# Patient Record
Sex: MALE | Race: BLACK OR AFRICAN AMERICAN | NOT HISPANIC OR LATINO | URBAN - METROPOLITAN AREA
[De-identification: names, ages, dates, MRNs, and addresses within clinical notes are randomized per-mention and may not be internally consistent; named-entity substitution may affect disease eponyms.]

---

## 2022-11-10 ENCOUNTER — INPATIENT (INPATIENT)
Facility: HOSPITAL | Age: 32
LOS: 2 days | Discharge: HOME | End: 2022-11-13
Attending: STUDENT IN AN ORGANIZED HEALTH CARE EDUCATION/TRAINING PROGRAM | Admitting: STUDENT IN AN ORGANIZED HEALTH CARE EDUCATION/TRAINING PROGRAM

## 2022-11-10 VITALS
SYSTOLIC BLOOD PRESSURE: 183 MMHG | TEMPERATURE: 99 F | RESPIRATION RATE: 16 BRPM | DIASTOLIC BLOOD PRESSURE: 105 MMHG | OXYGEN SATURATION: 100 % | HEART RATE: 94 BPM

## 2022-11-10 LAB
ALBUMIN SERPL ELPH-MCNC: 4.4 G/DL — SIGNIFICANT CHANGE UP (ref 3.3–5)
ALP SERPL-CCNC: 75 U/L — SIGNIFICANT CHANGE UP (ref 40–120)
ALT FLD-CCNC: 19 U/L — SIGNIFICANT CHANGE UP (ref 4–41)
ANION GAP SERPL CALC-SCNC: 13 MMOL/L — SIGNIFICANT CHANGE UP (ref 7–14)
AST SERPL-CCNC: 27 U/L — SIGNIFICANT CHANGE UP (ref 4–40)
BASOPHILS # BLD AUTO: 0.02 K/UL — SIGNIFICANT CHANGE UP (ref 0–0.2)
BASOPHILS NFR BLD AUTO: 0.3 % — SIGNIFICANT CHANGE UP (ref 0–2)
BILIRUB SERPL-MCNC: 0.5 MG/DL — SIGNIFICANT CHANGE UP (ref 0.2–1.2)
BUN SERPL-MCNC: 14 MG/DL — SIGNIFICANT CHANGE UP (ref 7–23)
CALCIUM SERPL-MCNC: 9.3 MG/DL — SIGNIFICANT CHANGE UP (ref 8.4–10.5)
CHLORIDE SERPL-SCNC: 101 MMOL/L — SIGNIFICANT CHANGE UP (ref 98–107)
CO2 SERPL-SCNC: 24 MMOL/L — SIGNIFICANT CHANGE UP (ref 22–31)
CREAT SERPL-MCNC: 1.57 MG/DL — HIGH (ref 0.5–1.3)
CRP SERPL-MCNC: 19.9 MG/L — HIGH
EGFR: 60 ML/MIN/1.73M2 — SIGNIFICANT CHANGE UP
EOSINOPHIL # BLD AUTO: 0.04 K/UL — SIGNIFICANT CHANGE UP (ref 0–0.5)
EOSINOPHIL NFR BLD AUTO: 0.7 % — SIGNIFICANT CHANGE UP (ref 0–6)
ERYTHROCYTE [SEDIMENTATION RATE] IN BLOOD: 7 MM/HR — SIGNIFICANT CHANGE UP (ref 1–15)
FLUAV AG NPH QL: SIGNIFICANT CHANGE UP
FLUBV AG NPH QL: SIGNIFICANT CHANGE UP
GLUCOSE SERPL-MCNC: 92 MG/DL — SIGNIFICANT CHANGE UP (ref 70–99)
HCT VFR BLD CALC: 45.5 % — SIGNIFICANT CHANGE UP (ref 39–50)
HGB BLD-MCNC: 15.5 G/DL — SIGNIFICANT CHANGE UP (ref 13–17)
IANC: 4.8 K/UL — SIGNIFICANT CHANGE UP (ref 1.8–7.4)
IMM GRANULOCYTES NFR BLD AUTO: 0.2 % — SIGNIFICANT CHANGE UP (ref 0–0.9)
LYMPHOCYTES # BLD AUTO: 0.5 K/UL — LOW (ref 1–3.3)
LYMPHOCYTES # BLD AUTO: 8.2 % — LOW (ref 13–44)
MCHC RBC-ENTMCNC: 26.6 PG — LOW (ref 27–34)
MCHC RBC-ENTMCNC: 34.1 GM/DL — SIGNIFICANT CHANGE UP (ref 32–36)
MCV RBC AUTO: 78.2 FL — LOW (ref 80–100)
MONOCYTES # BLD AUTO: 0.7 K/UL — SIGNIFICANT CHANGE UP (ref 0–0.9)
MONOCYTES NFR BLD AUTO: 11.5 % — SIGNIFICANT CHANGE UP (ref 2–14)
NEUTROPHILS # BLD AUTO: 4.8 K/UL — SIGNIFICANT CHANGE UP (ref 1.8–7.4)
NEUTROPHILS NFR BLD AUTO: 79.1 % — HIGH (ref 43–77)
NRBC # BLD: 0 /100 WBCS — SIGNIFICANT CHANGE UP (ref 0–0)
NRBC # FLD: 0 K/UL — SIGNIFICANT CHANGE UP (ref 0–0)
PLATELET # BLD AUTO: 197 K/UL — SIGNIFICANT CHANGE UP (ref 150–400)
POTASSIUM SERPL-MCNC: 4.1 MMOL/L — SIGNIFICANT CHANGE UP (ref 3.5–5.3)
POTASSIUM SERPL-SCNC: 4.1 MMOL/L — SIGNIFICANT CHANGE UP (ref 3.5–5.3)
PROT SERPL-MCNC: 7.9 G/DL — SIGNIFICANT CHANGE UP (ref 6–8.3)
RBC # BLD: 5.82 M/UL — HIGH (ref 4.2–5.8)
RBC # FLD: 12.9 % — SIGNIFICANT CHANGE UP (ref 10.3–14.5)
RSV RNA NPH QL NAA+NON-PROBE: SIGNIFICANT CHANGE UP
SARS-COV-2 RNA SPEC QL NAA+PROBE: SIGNIFICANT CHANGE UP
SODIUM SERPL-SCNC: 138 MMOL/L — SIGNIFICANT CHANGE UP (ref 135–145)
WBC # BLD: 6.07 K/UL — SIGNIFICANT CHANGE UP (ref 3.8–10.5)
WBC # FLD AUTO: 6.07 K/UL — SIGNIFICANT CHANGE UP (ref 3.8–10.5)

## 2022-11-10 PROCEDURE — 70498 CT ANGIOGRAPHY NECK: CPT | Mod: 26,MA

## 2022-11-10 PROCEDURE — 70496 CT ANGIOGRAPHY HEAD: CPT | Mod: 26,MA

## 2022-11-10 PROCEDURE — 99285 EMERGENCY DEPT VISIT HI MDM: CPT

## 2022-11-10 RX ORDER — ACETAMINOPHEN 500 MG
650 TABLET ORAL ONCE
Refills: 0 | Status: COMPLETED | OUTPATIENT
Start: 2022-11-10 | End: 2022-11-10

## 2022-11-10 RX ORDER — SODIUM CHLORIDE 9 MG/ML
1000 INJECTION INTRAMUSCULAR; INTRAVENOUS; SUBCUTANEOUS ONCE
Refills: 0 | Status: COMPLETED | OUTPATIENT
Start: 2022-11-10 | End: 2022-11-10

## 2022-11-10 RX ORDER — SODIUM CHLORIDE 9 MG/ML
1000 INJECTION INTRAMUSCULAR; INTRAVENOUS; SUBCUTANEOUS
Refills: 0 | Status: DISCONTINUED | OUTPATIENT
Start: 2022-11-10 | End: 2022-11-11

## 2022-11-10 RX ORDER — METOCLOPRAMIDE HCL 10 MG
10 TABLET ORAL ONCE
Refills: 0 | Status: COMPLETED | OUTPATIENT
Start: 2022-11-10 | End: 2022-11-10

## 2022-11-10 RX ADMIN — Medication 650 MILLIGRAM(S): at 22:46

## 2022-11-10 RX ADMIN — Medication 10 MILLIGRAM(S): at 16:42

## 2022-11-10 RX ADMIN — Medication 650 MILLIGRAM(S): at 16:43

## 2022-11-10 RX ADMIN — SODIUM CHLORIDE 1000 MILLILITER(S): 9 INJECTION INTRAMUSCULAR; INTRAVENOUS; SUBCUTANEOUS at 16:43

## 2022-11-10 RX ADMIN — Medication 650 MILLIGRAM(S): at 23:45

## 2022-11-10 RX ADMIN — Medication 650 MILLIGRAM(S): at 17:00

## 2022-11-10 NOTE — ED ADULT TRIAGE NOTE - CHIEF COMPLAINT QUOTE
Pt w/ hx of htn not on medication c/o nausea vomiting neck pain headache feeling light headed intermittent visual changes described as black dots, since last night

## 2022-11-10 NOTE — ED PROVIDER NOTE - CLINICAL SUMMARY MEDICAL DECISION MAKING FREE TEXT BOX
32-year-old male history of hypertension without any medication chief complaint of headache nausea and vomiting.  Given history and physical possibility for undiagnosed migraine versus upper respiratory/gastritis.  Versus lower suspicion for meningitis given no fevers at home.  Will obtain CT scan of head and neck given this neck pain with new nausea and vomiting.  Rule out dissection.  And CT head to rule out mass-effect causing the symptoms.  We will treat accordingly.

## 2022-11-10 NOTE — ED PROVIDER NOTE - PHYSICAL EXAMINATION
GENERAL: Awake, alert, NAD Patient is well-appearing  HEENT: NC/AT, moist mucous membranes, PERRL, EOMI Visual acuity 20/20 bilateral  LUNGS: CTAB, no wheezes or crackles   CARDIAC: RRR, no m/r/g  ABDOMEN: Soft, non tender, non distended, no rebound, no guarding  BACK: No midline spinal tenderness, no CVA tenderness  EXT: No edema, no calf tenderness, 2+ DP pulses bilaterally, no deformities.  NEURO: NEURO: alert, CN 2-12 intact,, sensation intact throughout, coordination shows no abnormalities , finger to nose accomplished b/l , motor 5/5 RUE/LUE/RLE/LLE/EHL/Plantar flexion,  SKIN: Warm and dry. No rash.  PSYCH: Normal affect.

## 2022-11-10 NOTE — ED PROVIDER NOTE - OBJECTIVE STATEMENT
32-year-old male history of hypertension does not take any medication for since has been out of medication for 2 years.  Coming in for headache nausea and vomiting.  Patient notes since past approximately 5 days ago he has been having a headache frontal in nature.  This is like his other headaches in same location but the severity is increased.  Patient has also been having small episodes of vomiting as well 2 small episodes today.  Described as green nonbloody.  Patient also states he is seeing black dots in his vision.  Otherwise no reported fever at home. 32-year-old male history of hypertension does not take any medication for since has been out of medication for 2 years.  Coming in for headache nausea and vomiting.  Patient notes since past approximately 5 days ago he has been having a headache frontal in nature.  This is like his other headaches in same location but the severity is increased.  Patient has also been having small episodes of vomiting as well 2 small episodes today.  Described as green nonbloody.  Patient also states he is seeing black dots in his vision. Otherwise no reported fever at home. No palliative or provocative factors. No other current complaints at this time.

## 2022-11-10 NOTE — ED PROVIDER NOTE - ATTENDING CONTRIBUTION TO CARE
I have personally performed a history and physical examination of the patient and discussed management with the resident as well as the patient.  I reviewed the resident's note and agree with the documented findings and plan of care.  I have authored and modified critical sections of the Provider Note, including but not limited to HPI, Physical Exam and MDM.      32-year-old male history of hypertension currently noncompliant with medications for the last 2 years presents with headache, nausea, vomiting.  Considered meningitis however exam benign, likely viral in nature rather than bacterial.  No nuchal rigidity or focal deficits.  Discussed considering LP given fever on second temperature reading, however repeat temperature afebrile after that.  Patient verbalized understanding and has elected to forego LP at this time.  Will obtain CT head and neck given headache in the setting of uncontrolled hypertension to screen for encephalopathic changes.  Symptomatic control as needed

## 2022-11-10 NOTE — ED PROVIDER NOTE - PROGRESS NOTE DETAILS
Patient was revitalized with a temp of 99.6 a few minutes after obtaining Tylenol Corey PGY2: took pt in sign out. still having mild HA. No further fevers but temp 99.9. Spoke w/ him willing to stay in CDU for HA, fever watch and Cr repeat. Ordered cultures and cxr to screen for infectious source. will cdu HERBERT Diaz:  Pt was dispo'd to CDU for neuro observation, supportive care, and repeat labs to ensure improving creatinine; pt was dispo'd with pending full RVP/COVID which subsequently tested positive for COVID (initial RVP with COVID by [facility] was negative).  RVP was repeated and confirmed COVID positive result; pt is being admitted to inpatient service accordingly. Statement Selected

## 2022-11-10 NOTE — ED ADULT NURSE NOTE - OBJECTIVE STATEMENT
presents with c/o nausea/vomiting, HA, neck pain, also reports blurry vision and chills off and on that started yesterday

## 2022-11-10 NOTE — ED ADULT NURSE NOTE - CHIEF COMPLAINT QUOTE
Pt w/ hx of htn not on medication c/o nausea vomiting neck pain headache feeling light headed intermittent visual changes described as black dots, since last night
yes

## 2022-11-11 DIAGNOSIS — R51.9 HEADACHE, UNSPECIFIED: ICD-10-CM

## 2022-11-11 DIAGNOSIS — R11.2 NAUSEA WITH VOMITING, UNSPECIFIED: ICD-10-CM

## 2022-11-11 DIAGNOSIS — H53.9 UNSPECIFIED VISUAL DISTURBANCE: ICD-10-CM

## 2022-11-11 DIAGNOSIS — N17.9 ACUTE KIDNEY FAILURE, UNSPECIFIED: ICD-10-CM

## 2022-11-11 DIAGNOSIS — U07.1 COVID-19: ICD-10-CM

## 2022-11-11 DIAGNOSIS — Z29.9 ENCOUNTER FOR PROPHYLACTIC MEASURES, UNSPECIFIED: ICD-10-CM

## 2022-11-11 DIAGNOSIS — I10 ESSENTIAL (PRIMARY) HYPERTENSION: ICD-10-CM

## 2022-11-11 DIAGNOSIS — R94.31 ABNORMAL ELECTROCARDIOGRAM [ECG] [EKG]: ICD-10-CM

## 2022-11-11 LAB
ALBUMIN SERPL ELPH-MCNC: 4 G/DL — SIGNIFICANT CHANGE UP (ref 3.3–5)
ALP SERPL-CCNC: 71 U/L — SIGNIFICANT CHANGE UP (ref 40–120)
ALT FLD-CCNC: 22 U/L — SIGNIFICANT CHANGE UP (ref 4–41)
ANION GAP SERPL CALC-SCNC: 9 MMOL/L — SIGNIFICANT CHANGE UP (ref 7–14)
APPEARANCE UR: CLEAR — SIGNIFICANT CHANGE UP
AST SERPL-CCNC: 21 U/L — SIGNIFICANT CHANGE UP (ref 4–40)
B PERT DNA SPEC QL NAA+PROBE: SIGNIFICANT CHANGE UP
B PERT+PARAPERT DNA PNL SPEC NAA+PROBE: SIGNIFICANT CHANGE UP
BILIRUB SERPL-MCNC: 0.4 MG/DL — SIGNIFICANT CHANGE UP (ref 0.2–1.2)
BILIRUB UR-MCNC: NEGATIVE — SIGNIFICANT CHANGE UP
BORDETELLA PARAPERTUSSIS (RAPRVP): SIGNIFICANT CHANGE UP
BUN SERPL-MCNC: 11 MG/DL — SIGNIFICANT CHANGE UP (ref 7–23)
C PNEUM DNA SPEC QL NAA+PROBE: SIGNIFICANT CHANGE UP
CALCIUM SERPL-MCNC: 9 MG/DL — SIGNIFICANT CHANGE UP (ref 8.4–10.5)
CHLORIDE SERPL-SCNC: 104 MMOL/L — SIGNIFICANT CHANGE UP (ref 98–107)
CO2 SERPL-SCNC: 25 MMOL/L — SIGNIFICANT CHANGE UP (ref 22–31)
COLOR SPEC: SIGNIFICANT CHANGE UP
CREAT ?TM UR-MCNC: 39 MG/DL — SIGNIFICANT CHANGE UP
CREAT SERPL-MCNC: 1.33 MG/DL — HIGH (ref 0.5–1.3)
D DIMER BLD IA.RAPID-MCNC: 150 NG/ML DDU — SIGNIFICANT CHANGE UP
DIFF PNL FLD: NEGATIVE — SIGNIFICANT CHANGE UP
EGFR: 73 ML/MIN/1.73M2 — SIGNIFICANT CHANGE UP
FERRITIN SERPL-MCNC: 174 NG/ML — SIGNIFICANT CHANGE UP (ref 30–400)
FLUAV SUBTYP SPEC NAA+PROBE: SIGNIFICANT CHANGE UP
FLUBV RNA SPEC QL NAA+PROBE: SIGNIFICANT CHANGE UP
GLUCOSE SERPL-MCNC: 120 MG/DL — HIGH (ref 70–99)
GLUCOSE UR QL: NEGATIVE — SIGNIFICANT CHANGE UP
HADV DNA SPEC QL NAA+PROBE: SIGNIFICANT CHANGE UP
HCOV 229E RNA SPEC QL NAA+PROBE: SIGNIFICANT CHANGE UP
HCOV HKU1 RNA SPEC QL NAA+PROBE: SIGNIFICANT CHANGE UP
HCOV NL63 RNA SPEC QL NAA+PROBE: SIGNIFICANT CHANGE UP
HCOV OC43 RNA SPEC QL NAA+PROBE: SIGNIFICANT CHANGE UP
HCT VFR BLD CALC: 43.4 % — SIGNIFICANT CHANGE UP (ref 39–50)
HGB BLD-MCNC: 15.1 G/DL — SIGNIFICANT CHANGE UP (ref 13–17)
HMPV RNA SPEC QL NAA+PROBE: SIGNIFICANT CHANGE UP
HPIV1 RNA SPEC QL NAA+PROBE: SIGNIFICANT CHANGE UP
HPIV2 RNA SPEC QL NAA+PROBE: SIGNIFICANT CHANGE UP
HPIV3 RNA SPEC QL NAA+PROBE: SIGNIFICANT CHANGE UP
HPIV4 RNA SPEC QL NAA+PROBE: SIGNIFICANT CHANGE UP
KETONES UR-MCNC: NEGATIVE — SIGNIFICANT CHANGE UP
LDH SERPL L TO P-CCNC: 186 U/L — SIGNIFICANT CHANGE UP (ref 135–225)
LEUKOCYTE ESTERASE UR-ACNC: NEGATIVE — SIGNIFICANT CHANGE UP
M PNEUMO DNA SPEC QL NAA+PROBE: SIGNIFICANT CHANGE UP
MAGNESIUM SERPL-MCNC: 1.7 MG/DL — SIGNIFICANT CHANGE UP (ref 1.6–2.6)
MCHC RBC-ENTMCNC: 27 PG — SIGNIFICANT CHANGE UP (ref 27–34)
MCHC RBC-ENTMCNC: 34.8 GM/DL — SIGNIFICANT CHANGE UP (ref 32–36)
MCV RBC AUTO: 77.6 FL — LOW (ref 80–100)
NITRITE UR-MCNC: NEGATIVE — SIGNIFICANT CHANGE UP
NRBC # BLD: 0 /100 WBCS — SIGNIFICANT CHANGE UP (ref 0–0)
NRBC # FLD: 0 K/UL — SIGNIFICANT CHANGE UP (ref 0–0)
PH UR: 7 — SIGNIFICANT CHANGE UP (ref 5–8)
PHOSPHATE SERPL-MCNC: 2.2 MG/DL — LOW (ref 2.5–4.5)
PLATELET # BLD AUTO: 185 K/UL — SIGNIFICANT CHANGE UP (ref 150–400)
POTASSIUM SERPL-MCNC: 3.3 MMOL/L — LOW (ref 3.5–5.3)
POTASSIUM SERPL-SCNC: 3.3 MMOL/L — LOW (ref 3.5–5.3)
PROT SERPL-MCNC: 7 G/DL — SIGNIFICANT CHANGE UP (ref 6–8.3)
PROT UR-MCNC: ABNORMAL
RAPID RVP RESULT: DETECTED
RBC # BLD: 5.59 M/UL — SIGNIFICANT CHANGE UP (ref 4.2–5.8)
RBC # FLD: 13 % — SIGNIFICANT CHANGE UP (ref 10.3–14.5)
RSV RNA SPEC QL NAA+PROBE: SIGNIFICANT CHANGE UP
RV+EV RNA SPEC QL NAA+PROBE: SIGNIFICANT CHANGE UP
SARS-COV-2 RNA SPEC QL NAA+PROBE: DETECTED
SODIUM SERPL-SCNC: 138 MMOL/L — SIGNIFICANT CHANGE UP (ref 135–145)
SODIUM UR-SCNC: 134 MMOL/L — SIGNIFICANT CHANGE UP
SP GR SPEC: 1.04 — SIGNIFICANT CHANGE UP (ref 1.01–1.05)
TROPONIN T, HIGH SENSITIVITY RESULT: 24 NG/L — SIGNIFICANT CHANGE UP
TROPONIN T, HIGH SENSITIVITY RESULT: 26 NG/L — SIGNIFICANT CHANGE UP
UROBILINOGEN FLD QL: SIGNIFICANT CHANGE UP
WBC # BLD: 3.39 K/UL — LOW (ref 3.8–10.5)
WBC # FLD AUTO: 3.39 K/UL — LOW (ref 3.8–10.5)

## 2022-11-11 PROCEDURE — 71046 X-RAY EXAM CHEST 2 VIEWS: CPT | Mod: 26

## 2022-11-11 PROCEDURE — 99223 1ST HOSP IP/OBS HIGH 75: CPT

## 2022-11-11 RX ORDER — ACETAMINOPHEN 500 MG
0 TABLET ORAL
Qty: 0 | Refills: 0 | DISCHARGE

## 2022-11-11 RX ORDER — SODIUM CHLORIDE 9 MG/ML
1000 INJECTION INTRAMUSCULAR; INTRAVENOUS; SUBCUTANEOUS
Refills: 0 | Status: DISCONTINUED | OUTPATIENT
Start: 2022-11-11 | End: 2022-11-13

## 2022-11-11 RX ORDER — ACETAMINOPHEN 500 MG
650 TABLET ORAL EVERY 6 HOURS
Refills: 0 | Status: DISCONTINUED | OUTPATIENT
Start: 2022-11-11 | End: 2022-11-13

## 2022-11-11 RX ORDER — METOCLOPRAMIDE HCL 10 MG
10 TABLET ORAL ONCE
Refills: 0 | Status: COMPLETED | OUTPATIENT
Start: 2022-11-11 | End: 2022-11-11

## 2022-11-11 RX ORDER — INFLUENZA VIRUS VACCINE 15; 15; 15; 15 UG/.5ML; UG/.5ML; UG/.5ML; UG/.5ML
0.5 SUSPENSION INTRAMUSCULAR ONCE
Refills: 0 | Status: DISCONTINUED | OUTPATIENT
Start: 2022-11-11 | End: 2022-11-13

## 2022-11-11 RX ORDER — SODIUM,POTASSIUM PHOSPHATES 278-250MG
1 POWDER IN PACKET (EA) ORAL THREE TIMES A DAY
Refills: 0 | Status: COMPLETED | OUTPATIENT
Start: 2022-11-11 | End: 2022-11-12

## 2022-11-11 RX ORDER — ENOXAPARIN SODIUM 100 MG/ML
40 INJECTION SUBCUTANEOUS EVERY 24 HOURS
Refills: 0 | Status: DISCONTINUED | OUTPATIENT
Start: 2022-11-12 | End: 2022-11-13

## 2022-11-11 RX ORDER — POTASSIUM CHLORIDE 20 MEQ
40 PACKET (EA) ORAL EVERY 4 HOURS
Refills: 0 | Status: COMPLETED | OUTPATIENT
Start: 2022-11-11 | End: 2022-11-11

## 2022-11-11 RX ORDER — AMLODIPINE BESYLATE 2.5 MG/1
5 TABLET ORAL DAILY
Refills: 0 | Status: DISCONTINUED | OUTPATIENT
Start: 2022-11-11 | End: 2022-11-12

## 2022-11-11 RX ADMIN — SODIUM CHLORIDE 75 MILLILITER(S): 9 INJECTION INTRAMUSCULAR; INTRAVENOUS; SUBCUTANEOUS at 10:39

## 2022-11-11 RX ADMIN — Medication 40 MILLIEQUIVALENT(S): at 17:02

## 2022-11-11 RX ADMIN — Medication 1 PACKET(S): at 13:22

## 2022-11-11 RX ADMIN — SODIUM CHLORIDE 100 MILLILITER(S): 9 INJECTION INTRAMUSCULAR; INTRAVENOUS; SUBCUTANEOUS at 00:36

## 2022-11-11 RX ADMIN — AMLODIPINE BESYLATE 5 MILLIGRAM(S): 2.5 TABLET ORAL at 18:34

## 2022-11-11 RX ADMIN — Medication 10 MILLIGRAM(S): at 13:22

## 2022-11-11 RX ADMIN — Medication 40 MILLIEQUIVALENT(S): at 13:22

## 2022-11-11 NOTE — H&P ADULT - HISTORY OF PRESENT ILLNESS
32M PMHx HTN (non compliant with meds) who presents with 3-4 day history of headache and neck pain and 1 day history of N/V, light headedness. Patient states that he started to develop a frontal headache over the past few days. He described pain as 7/10 on pain scale, intermittent, frontally located, partially alleviated by tylenol. He though he should cut down on smoking Hookah every night because it could be related to headaches. He also endorses posterior right sided neck pain, in paraspinal muscles, for the past few days. Pain is made better when looking to the left and made worse when looking to the right. He reports getting new pillows recently and thinks this may be a culprit. Yesterday, he woke up and wasn't feeling well, didn't eat or drink much, and vomited twice. He also noted feeling light headed and seeing faint black spots in his vision which he states he has before when his blood pressure has been high. He endorsed 1 episode of diarrhea. He decided to take himself to the ER because he wanted a CT scan of his brain and to ensure his BP was ok. He otherwise denies CP, SOB, abdominal pain, fever (while at home), chills, constipation, focal weakness, numbness/tingling, dysuria. In the ED he was found with BP in 180s, spiked 103F fever, found to be covid positive, and with THAD. He got CTA H/N and CT brain which where unremarkable. LP was discussed and he declined. His vital signs were otherwise stable.

## 2022-11-11 NOTE — H&P ADULT - ASSESSMENT
32M PMHx HTN (non compliant with meds) who presents with 3-4 day history of headache and neck pain and 1 day history of N/V, light headedness. Found to be febrile to 103F and Covid positive with THAD and uncontrolled BP.

## 2022-11-11 NOTE — H&P ADULT - PROBLEM SELECTOR PLAN 2
- Frontal headache that still persists, mildly improved by tylenol in ED; also got a dose of reglan x 1  - CTH and CTA H/N is negative for acute findings  - Neuro exam grossly non focal  - suspect headache likely in setting of viral infection and dehydration   - maintenance IVF and tylenol PRN - Frontal headache and right sided posterior cervical neck pain still persists, mildly improved by tylenol in ED; also got a dose of reglan x 1  - CTH and CTA H/N is negative for acute findings  - Neuro exam grossly non focal  - suspect headache likely in setting of viral infection and dehydration   - neck pain appears to paraspinal muscle soreness, possibly from sleep differently on new pillows; has full ROM of neck without stiffness; continue to monitor   - maintenance IVF and tylenol PRN

## 2022-11-11 NOTE — H&P ADULT - PROBLEM SELECTOR PLAN 6
- BP as high as 183/105 in ED, now improved to 156/96 but still elevated  - Patient is not on any BP meds, he stopped taking them a long time ago as he felt he didn't need them  - he was prescribed Amlodipine/Benazapril 10-40mg daily in February 2022   - if BP remains elevated would restart with at least Norvasc 5mg daily   - continue to encourage compliance and will need outpatient follow up - BP as high as 183/105 in ED, now improved to 156/96 but still elevated  - Patient is not on any BP meds, he stopped taking them a long time ago as he felt he didn't need them  - he was prescribed Amlodipine/Benazapril 10-40mg daily in February 2022   - if BP remains elevated could consider starting back on Norvasc 5mg daily if needed  - continue to encourage compliance and will need outpatient follow up

## 2022-11-11 NOTE — H&P ADULT - PROBLEM SELECTOR PLAN 1
- Patient with tmax of 103F documented in ED flowsheet, now afebrile s/p 2 doses of tylenol   - found to be covid positive x 2  - Given headaches and fever and complaint of neck pain, LP was discussed in ED to R/O meningitis however low suspicion given source of fever likely Covid, no nuchal rigidity, and neck pain appears to be MSK  - patient declined LP in ED; hold off for now   - check inflammatory markers including D-dimer   - maintain contact/airborne isolation  - no respiratory complaints, would hold off on rem/dex for now given overall low risk in this young healthy patient and no hypoxia   - lovenox for DVT ppx per protocol   - follow UCx and BCx x 2 that are already in lab  - tylenol PRN fever

## 2022-11-11 NOTE — H&P ADULT - PROBLEM SELECTOR PLAN 4
- Patient with sensation of feeling light headed yesterday and transiently saw black spots in vision  - he states that this has happened to him before when his pressure has been high  - CTH and CTA neg   - visual acuity in ED 20/20 bilaterally  - no further visual complaints  - BP control   - if recurrent symptoms would consult opthalmology

## 2022-11-11 NOTE — H&P ADULT - NSHPSOCIALHISTORY_GEN_ALL_CORE
Lives at home alone.     Denies smoking cigarettes.   Does endorse smoking Hookah (tobacco) most nights as of late.   Admits drinking a couple cocktails 3-4x weekly.   Denies drug use.

## 2022-11-11 NOTE — H&P ADULT - RESPIRATORY
normal/clear to auscultation bilaterally/no wheezes/no rales/no rhonchi/no respiratory distress/no use of accessory muscles/breath sounds equal/good air movement/no intercostal retractions

## 2022-11-11 NOTE — H&P ADULT - PROBLEM SELECTOR PLAN 5
- Patients creatinine 1.57, no prior labs to compare to   - no prior hx of kidney disease per patient  - no urinary complaints   - likely acute due to dehydration and viral infection  - s/p 1L NS bolus in ED followed by maintenance IVF   - continue gentle IVF with NS @ 75/hr for now  - repeat BMP this morning  - check UrNa and UrCr to calculate FeNa but suspect prerenal etiology   - if no improvement with hydration will check bladder scan and renal/bladder sonogram   - avoid nephrotoxins

## 2022-11-11 NOTE — H&P ADULT - PROBLEM SELECTOR PLAN 7
- EKG showing NSR @ 89bpm, QTc 481, nonspecific TW changes with biphasic TW V5-V6, avL, and II; no apparent acute ischemic changes, no prior EKG to compare to   - no current cardiac complaints  - will add on screening troponin   - would consider ECHO for further evaluation - EKG showing NSR @ 89bpm, QTc 481, nonspecific TW changes with biphasic TW V5-V6, avL, and II; no apparent acute ischemic changes, no prior EKG to compare to   - no current cardiac complaints  - will add on screening troponin   - check ECHO for further evaluation

## 2022-11-11 NOTE — H&P ADULT - PROBLEM SELECTOR PLAN 3
- Patient's N/V has resolved, no further diarrhea as well  - unclear etiology, possible in setting of viral infection  - abdominal exam is benign   - monitor clinically  - if need can use zofran PRN

## 2022-11-11 NOTE — H&P ADULT - NSICDXFAMILYHX_GEN_ALL_CORE_FT
FAMILY HISTORY:  Sibling  Still living? Unknown  Family history of sickle cell disease, Age at diagnosis: Age Unknown

## 2022-11-11 NOTE — H&P ADULT - NSHPOUTPATIENTPROVIDERS_GEN_ALL_CORE
Followed with a Dr. Alexis in [facility] but lost to follow up, looking for new PMD

## 2022-11-11 NOTE — H&P ADULT - NSHPLABSRESULTS_GEN_ALL_CORE
15.5   6.07  )-----------( 197      ( 10 Nov 2022 16:40 )             45.5   11-10    138  |  101  |  14  ----------------------------<  92  4.1   |  24  |  1.57<H>    Ca    9.3      10 Nov 2022 16:40    TPro  7.9  /  Alb  4.4  /  TBili  0.5  /  DBili  x   /  AST  27  /  ALT  19  /  AlkPhos  75  11-10    UA negative  UCx pending  BCx x 2 pending in lab  ESR 7  CRP 19.9  Covid positive x 2    EKG: NSR @ 89bpm, QTc 481, biphasic TW V5-V6, avL, and II; no apparent acute ischemic changes, no prior EKG to compare to     CXR: Clear lungs.    CT HEAD: Unremarkable, unenhanced CT.    CTA BRAIN: Patent intracranial circulation. No proximal flow-limiting   stenosis, dissection, or occlusion.    CTA NECK: Patent cervical vasculature. No flow limiting stenosis,   dissection, or occlusion.

## 2022-11-12 LAB
ALBUMIN SERPL ELPH-MCNC: 4.1 G/DL — SIGNIFICANT CHANGE UP (ref 3.3–5)
ALP SERPL-CCNC: 73 U/L — SIGNIFICANT CHANGE UP (ref 40–120)
ALT FLD-CCNC: 23 U/L — SIGNIFICANT CHANGE UP (ref 4–41)
ANION GAP SERPL CALC-SCNC: 13 MMOL/L — SIGNIFICANT CHANGE UP (ref 7–14)
ANION GAP SERPL CALC-SCNC: 13 MMOL/L — SIGNIFICANT CHANGE UP (ref 7–14)
AST SERPL-CCNC: 22 U/L — SIGNIFICANT CHANGE UP (ref 4–40)
BILIRUB SERPL-MCNC: 0.4 MG/DL — SIGNIFICANT CHANGE UP (ref 0.2–1.2)
BUN SERPL-MCNC: 12 MG/DL — SIGNIFICANT CHANGE UP (ref 7–23)
BUN SERPL-MCNC: 12 MG/DL — SIGNIFICANT CHANGE UP (ref 7–23)
CALCIUM SERPL-MCNC: 9.2 MG/DL — SIGNIFICANT CHANGE UP (ref 8.4–10.5)
CALCIUM SERPL-MCNC: 9.2 MG/DL — SIGNIFICANT CHANGE UP (ref 8.4–10.5)
CHLORIDE SERPL-SCNC: 101 MMOL/L — SIGNIFICANT CHANGE UP (ref 98–107)
CHLORIDE SERPL-SCNC: 101 MMOL/L — SIGNIFICANT CHANGE UP (ref 98–107)
CO2 SERPL-SCNC: 23 MMOL/L — SIGNIFICANT CHANGE UP (ref 22–31)
CO2 SERPL-SCNC: 23 MMOL/L — SIGNIFICANT CHANGE UP (ref 22–31)
CREAT SERPL-MCNC: 1.27 MG/DL — SIGNIFICANT CHANGE UP (ref 0.5–1.3)
CREAT SERPL-MCNC: 1.27 MG/DL — SIGNIFICANT CHANGE UP (ref 0.5–1.3)
CULTURE RESULTS: SIGNIFICANT CHANGE UP
EGFR: 77 ML/MIN/1.73M2 — SIGNIFICANT CHANGE UP
EGFR: 77 ML/MIN/1.73M2 — SIGNIFICANT CHANGE UP
GLUCOSE SERPL-MCNC: 93 MG/DL — SIGNIFICANT CHANGE UP (ref 70–99)
GLUCOSE SERPL-MCNC: 93 MG/DL — SIGNIFICANT CHANGE UP (ref 70–99)
HCT VFR BLD CALC: 44.6 % — SIGNIFICANT CHANGE UP (ref 39–50)
HGB BLD-MCNC: 15.3 G/DL — SIGNIFICANT CHANGE UP (ref 13–17)
MAGNESIUM SERPL-MCNC: 1.8 MG/DL — SIGNIFICANT CHANGE UP (ref 1.6–2.6)
MCHC RBC-ENTMCNC: 26.9 PG — LOW (ref 27–34)
MCHC RBC-ENTMCNC: 34.3 GM/DL — SIGNIFICANT CHANGE UP (ref 32–36)
MCV RBC AUTO: 78.5 FL — LOW (ref 80–100)
NRBC # BLD: 0 /100 WBCS — SIGNIFICANT CHANGE UP (ref 0–0)
NRBC # FLD: 0 K/UL — SIGNIFICANT CHANGE UP (ref 0–0)
PHOSPHATE SERPL-MCNC: 3.6 MG/DL — SIGNIFICANT CHANGE UP (ref 2.5–4.5)
PLATELET # BLD AUTO: 199 K/UL — SIGNIFICANT CHANGE UP (ref 150–400)
POTASSIUM SERPL-MCNC: 3.8 MMOL/L — SIGNIFICANT CHANGE UP (ref 3.5–5.3)
POTASSIUM SERPL-MCNC: 3.8 MMOL/L — SIGNIFICANT CHANGE UP (ref 3.5–5.3)
POTASSIUM SERPL-SCNC: 3.8 MMOL/L — SIGNIFICANT CHANGE UP (ref 3.5–5.3)
POTASSIUM SERPL-SCNC: 3.8 MMOL/L — SIGNIFICANT CHANGE UP (ref 3.5–5.3)
PROT SERPL-MCNC: 7.5 G/DL — SIGNIFICANT CHANGE UP (ref 6–8.3)
RBC # BLD: 5.68 M/UL — SIGNIFICANT CHANGE UP (ref 4.2–5.8)
RBC # FLD: 13 % — SIGNIFICANT CHANGE UP (ref 10.3–14.5)
SODIUM SERPL-SCNC: 137 MMOL/L — SIGNIFICANT CHANGE UP (ref 135–145)
SODIUM SERPL-SCNC: 137 MMOL/L — SIGNIFICANT CHANGE UP (ref 135–145)
SPECIMEN SOURCE: SIGNIFICANT CHANGE UP
TSH SERPL-MCNC: 1.33 UIU/ML — SIGNIFICANT CHANGE UP (ref 0.27–4.2)
WBC # BLD: 3.49 K/UL — LOW (ref 3.8–10.5)
WBC # FLD AUTO: 3.49 K/UL — LOW (ref 3.8–10.5)

## 2022-11-12 PROCEDURE — 99222 1ST HOSP IP/OBS MODERATE 55: CPT | Mod: GC

## 2022-11-12 PROCEDURE — 99233 SBSQ HOSP IP/OBS HIGH 50: CPT

## 2022-11-12 PROCEDURE — 93306 TTE W/DOPPLER COMPLETE: CPT | Mod: 26

## 2022-11-12 RX ORDER — HYDROCHLOROTHIAZIDE 25 MG
12.5 TABLET ORAL ONCE
Refills: 0 | Status: COMPLETED | OUTPATIENT
Start: 2022-11-12 | End: 2022-11-12

## 2022-11-12 RX ORDER — AMLODIPINE BESYLATE 2.5 MG/1
1 TABLET ORAL
Qty: 30 | Refills: 3
Start: 2022-11-12 | End: 2023-03-11

## 2022-11-12 RX ORDER — AMLODIPINE BESYLATE 2.5 MG/1
10 TABLET ORAL DAILY
Refills: 0 | Status: DISCONTINUED | OUTPATIENT
Start: 2022-11-13 | End: 2022-11-13

## 2022-11-12 RX ORDER — HYDRALAZINE HCL 50 MG
2.5 TABLET ORAL ONCE
Refills: 0 | Status: COMPLETED | OUTPATIENT
Start: 2022-11-12 | End: 2022-11-12

## 2022-11-12 RX ORDER — HYDROCHLOROTHIAZIDE 25 MG
12.5 TABLET ORAL DAILY
Refills: 0 | Status: DISCONTINUED | OUTPATIENT
Start: 2022-11-13 | End: 2022-11-13

## 2022-11-12 RX ORDER — AMLODIPINE BESYLATE 2.5 MG/1
5 TABLET ORAL ONCE
Refills: 0 | Status: COMPLETED | OUTPATIENT
Start: 2022-11-12 | End: 2022-11-12

## 2022-11-12 RX ADMIN — AMLODIPINE BESYLATE 5 MILLIGRAM(S): 2.5 TABLET ORAL at 17:04

## 2022-11-12 RX ADMIN — AMLODIPINE BESYLATE 5 MILLIGRAM(S): 2.5 TABLET ORAL at 05:20

## 2022-11-12 RX ADMIN — Medication 12.5 MILLIGRAM(S): at 13:15

## 2022-11-12 RX ADMIN — Medication 2.5 MILLIGRAM(S): at 19:31

## 2022-11-12 NOTE — PROGRESS NOTE ADULT - PROBLEM SELECTOR PLAN 2
- Frontal headache and right sided posterior cervical neck pain still persists, mildly improved by tylenol in ED; also got a dose of reglan x 1  - CTH and CTA H/N is negative for acute findings  - Neuro exam grossly non focal  - suspect headache likely in setting of viral infection and dehydration   - neck pain appears to paraspinal muscle soreness, possibly from sleep differently on new pillows; has full ROM of neck without stiffness; continue to monitor   - maintenance IVF and tylenol PRN RESOLVED on 11/12 AM exam   - improved w/ Tylenol and Reglan   - CTH and CTA H/N is negative for acute findings  - Neuro exam grossly non focal  - suspect headache likely in setting of viral infection and dehydration   - neck pain appears to paraspinal muscle soreness, possibly from sleep differently on new pillows; has full ROM of neck without stiffness; continue to monitor   - Tylenol prn

## 2022-11-12 NOTE — PROGRESS NOTE ADULT - PROBLEM SELECTOR PLAN 8
- DVT ppx with Lovenox 40mg daily given covid infection per protocol   - Case and plan discussed with Dr. Valdez - DVT ppx with Lovenox 40mg daily given covid infection per protocol     Case Discussed w/ ACP

## 2022-11-12 NOTE — PROGRESS NOTE ADULT - PROBLEM SELECTOR PLAN 6
- BP as high as 183/105 in ED, now improved to 156/96 but still elevated  - Patient is not on any BP meds, he stopped taking them a long time ago as he felt he didn't need them  - he was prescribed Amlodipine/Benazapril 10-40mg daily in February 2022   - if BP remains elevated could consider starting back on Norvasc 5mg daily if needed  - continue to encourage compliance and will need outpatient follow up - BP as high as 183/105 in ED, now improved but still elevated  - Patient is not on any BP meds, he stopped taking them a long time ago as he felt he didn't need them and felt he was gaining weight on them  - he was prescribed Amlodipine/Benazapril 10-40mg daily in February 2022   - amlodipine 5mg + hctz 12.5 mg   - continue to encourage compliance and will need outpatient follow up

## 2022-11-12 NOTE — PROGRESS NOTE ADULT - PROBLEM SELECTOR PLAN 7
- EKG showing NSR @ 89bpm, QTc 481, nonspecific TW changes with biphasic TW V5-V6, avL, and II; no apparent acute ischemic changes, no prior EKG to compare to   - no current cardiac complaints  - will add on screening troponin   - check ECHO for further evaluation - EKG showing NSR @ 89bpm, QTc 481, nonspecific TW changes with biphasic TW V5-V6, avL, and II; no apparent acute ischemic changes, no prior EKG to compare to   - no current cardiac complaints  - will add on screening troponin   - ECHO abnormalities as above---> cardiology consulted awaiting recs

## 2022-11-12 NOTE — DISCHARGE NOTE PROVIDER - HOSPITAL COURSE
32M PMHx HTN (non compliant with meds) who presents with 3-4 day history of headache and neck pain and 1 day history of N/V, light headedness. Found to be febrile to 103F and Covid positive with THAD and uncontrolled BP.     Hospital course:  COVID-19.   - Febrile to 103F, resolved s/p tylenol   - Covid positive x 2; maintain contact/airborne isolation  - d-dimer 150, lovenox 40mg q24h per protocol, DC on _____   - no respiratory complaints, would hold off on rem/dex for now given overall low risk in this young healthy patient and no hypoxia   - UCx (negative) and BCx x 2 NGTD  - Remain on isolation at home to complete 10 day isolation     Uncontrolled hypertension with abnormal EKG   - BP as high as 183/105 in ED, now improved but still elevated  - Patient is not on any BP meds, he stopped taking them a long time ago as he felt he didn't need them and felt he was gaining weight on them  - he was prescribed Amlodipine/Benazapril 10-40mg daily in February 2022   - amlodipine 5mg + hctz 12.5 mg + ____ACEi/ ARB ?_____--> D/C on this regiment  - EKG showing NSR @ 89bpm, QTc 481, nonspecific TW changes with biphasic TW V5-V6, avL, and II; no apparent acute ischemic changes, no prior EKG to compare to   - Trop 24->26  - TTE EF 50%, mild segmental LV systolic dysfunction with hypokinesis of the lateral and inferolateral wall, normal RV systolic function  - Cardiology consulted --> abnormal ECHO finding may be in setting of COVID or elevated BP but not likely ischemic. Outpatient cardiac workup    Persistent headaches.   - In ED, Given headaches and fever and complaint of neck pain, LP was discussed to R/O meningitis.   - Low suspicion for meningitis and known source of fever likely Covid, no nuchal rigidity  - neck pain appears to paraspinal muscle soreness, possibly from sleep differently on new pillows; has full ROM of neck without stiffness  - patient declined LP in ED; hold off   - CTH and CTA H/N is negative for acute findings  - Neuro exam grossly non focal  - RESOLVED on 11/12 AM exam s/p w/ Tylenol and Reglan      Nausea & vomiting.   - unclear etiology, possible in setting of viral infection- RESOLVED   - abdominal exam is benign, monitor clinically     Visual changes.   - Patient with sensation of feeling light headed yesterday and transiently saw black spots in vision  - he states that this has happened to him before when his pressure has been high  - CTH and CTA neg   - visual acuity in ED 20/20 bilaterally  - no further visual complaints    THAD (acute kidney injury).   - Patients creatinine 1.57, no prior labs to compare to; crt today 1.27  - no prior hx of kidney disease per patient  - no urinary complaints   - FeNa 3.3%- intrinsic   - likely acute due to dehydration and viral infection; likely component of intrinsic disease in setting of HTN  - avoid nephrotoxins.    On_________, discussed with __________, patient is medically cleared and optimized for discharge today. All medications were reviewed with attending, and sent to mutually agreed upon pharmacy.  Reviewed discharge medications with patient; All new medications requiring new prescription sent to pharmacy of patients choice. Reviewed need for prescription for previous home medications and new prescriptions sent if requested. Patient in agreement and understands. 32M PMHx HTN (non compliant with meds) who presents with 3-4 day history of headache and neck pain and 1 day history of N/V, light headedness. Found to be febrile to 103F and Covid positive with THAD and uncontrolled BP.     Hospital course:  COVID-19- Febrile to 103F, resolved s/p Tylenol. Covid positive x 2; maintain contact/airborne isolation  - d-dimer 150, lovenox 40mg q24h   - no respiratory complaints, would hold off on rem/dex for now given overall low risk in this young healthy patient and no hypoxia   - UCx (negative) and BCx x 2 NGTD  - Remain on isolation at home to complete 10 day isolation     Uncontrolled hypertension with abnormal EKG - BP as high as 183/105 in ED, now improved but still elevated  - Patient is not on any BP meds, he stopped taking them a long time ago as he felt he didn't need them and felt he was gaining weight on them  - he was prescribed Amlodipine/Benazapril 10-40mg daily in February 2022 was non-compliant  - c/w Amlodipine 5mg + HCTZ 12.5 mg + ____ACEi/ ARB ?  - EKG showing NSR @ 89bpm, QTc 481, nonspecific TW changes with biphasic TW V5-V6, avL, and II; no apparent acute ischemic changes, no prior EKG to compare to   - Trop 24->26  - TTE EF 50%, mild segmental LV systolic dysfunction with hypokinesis of the lateral and inferolateral wall, normal RV systolic function  - Cardiology consulted --> abnormal ECHO finding may be in setting of COVID or elevated BP but not likely ischemic. Outpatient cardiac workup    Persistent headaches- In ED, Given headaches and fever and complaint of neck pain, LP was discussed to R/O meningitis.   - Low suspicion for meningitis and known source of fever likely Covid, no nuchal rigidity  - neck pain appears to paraspinal muscle soreness, possibly from sleep differently on new pillows; has full ROM of neck without stiffness  - patient declined LP in ED; hold off   - CTH and CTA H/N is negative for acute findings. Neuro exam grossly non focal  - RESOLVED on 11/12 AM exam s/p w/ Tylenol and Reglan      Nausea & vomiting- unclear etiology, possible in setting of viral infection- RESOLVED   - abdominal exam is benign, monitor clinically     Visual changes- Patient with sensation of feeling light headed yesterday and transiently saw black spots in vision  - he states that this has happened to him before when his pressure has been high  - CTH and CTA negative, visual acuity in ED 20/20 bilaterally  - no further visual complaints    THAD - Patients creatinine 1.57, no prior labs to compare to; crt today 1.27  - no prior hx of kidney disease per patient, no urinary complaints   - FeNa 3.3%- intrinsic   - likely acute due to dehydration and viral infection; likely component of intrinsic disease in setting of HTN  - avoid nephrotoxins.    On 11/13 discussed with Dr Huddleston, patient is medically cleared and optimized for discharge today. All medications were reviewed with attending, and sent to mutually agreed upon pharmacy. Patient in agreement and understands. 32M PMHx HTN (non compliant with meds) who presents with 3-4 day history of headache and neck pain and 1 day history of N/V, light headedness. Found to be febrile to 103F and Covid positive with THAD and uncontrolled BP.     Hospital course:  COVID-19- Febrile to 103F, resolved s/p Tylenol. Covid positive x 2; maintain contact/airborne isolation  - d-dimer 150, lovenox 40mg q24h   - no respiratory complaints, would hold off on rem/dex for now given overall low risk in this young healthy patient and no hypoxia   - UCx (negative) and BCx x 2 NGTD  - Remain on isolation at home to complete 10 day isolation     Uncontrolled hypertension with abnormal EKG - BP as high as 183/105 in ED, now improved but still elevated  - Patient is not on any BP meds, he stopped taking them a long time ago as he felt he didn't need them and felt he was gaining weight on them  - he was prescribed Amlodipine/Benazapril 10-40mg daily in February 2022 was non-compliant  - c/w Amlodipine 10mg + HCTZ 12.5 mg   - EKG showing NSR @ 89bpm, QTc 481, nonspecific TW changes with biphasic TW V5-V6, avL, and II; no apparent acute ischemic changes, no prior EKG to compare to   - Trop 24->26  - TTE EF 50%, mild segmental LV systolic dysfunction with hypokinesis of the lateral and inferolateral wall, normal RV systolic function  - Cardiology consulted --> abnormal ECHO finding may be in setting of COVID or elevated BP but not likely ischemic. Outpatient cardiac workup    Persistent headaches- In ED, Given headaches and fever and complaint of neck pain, LP was discussed to R/O meningitis.   - Low suspicion for meningitis and known source of fever likely Covid, no nuchal rigidity  - neck pain appears to paraspinal muscle soreness, possibly from sleep differently on new pillows; has full ROM of neck without stiffness  - patient declined LP in ED; hold off   - CTH and CTA H/N is negative for acute findings. Neuro exam grossly non focal  - RESOLVED on 11/12 AM exam s/p w/ Tylenol and Reglan      Nausea & vomiting- unclear etiology, possible in setting of viral infection- RESOLVED   - abdominal exam is benign, monitor clinically     Visual changes- Patient with sensation of feeling light headed yesterday and transiently saw black spots in vision  - he states that this has happened to him before when his pressure has been high  - CTH and CTA negative, visual acuity in ED 20/20 bilaterally  - no further visual complaints    THAD - Patients creatinine 1.57, no prior labs to compare to; crt today 1.27  - no prior hx of kidney disease per patient, no urinary complaints   - FeNa 3.3%- intrinsic   - likely acute due to dehydration and viral infection; likely component of intrinsic disease in setting of HTN  - avoid nephrotoxins.    On 11/13 discussed with Dr Huddleston, patient is medically cleared and optimized for discharge today. All medications were reviewed with attending, and sent to mutually agreed upon pharmacy. Patient in agreement and understands.

## 2022-11-12 NOTE — PROGRESS NOTE ADULT - PROBLEM SELECTOR PLAN 5
*******  - Patients creatinine 1.57, no prior labs to compare to   - no prior hx of kidney disease per patient  - no urinary complaints   - likely acute due to dehydration and viral infection  - s/p 1L NS bolus in ED followed by maintenance IVF   - continue gentle IVF with NS @ 75/hr for now  - repeat BMP this morning  - check UrNa and UrCr to calculate FeNa but suspect prerenal etiology   - if no improvement with hydration will check bladder scan and renal/bladder sonogram   - avoid nephrotoxins - Patients creatinine 1.57, no prior labs to compare to; crt today 1.27  - no prior hx of kidney disease per patient  - no urinary complaints   - likely acute due to dehydration and viral infection; likely component of intrinsic disease in setting of HTN  - avoid nephrotoxins

## 2022-11-12 NOTE — DISCHARGE NOTE PROVIDER - NSFOLLOWUPCLINICS_GEN_ALL_ED_FT
[facility]  Cardiology  [address] [place] [id number]  Phone: [phone]  Fax:     [facility]  General Internal Medicine  [address] [place] [address]  Phone: [phone]  Fax:

## 2022-11-12 NOTE — DISCHARGE NOTE PROVIDER - NSDCCPCAREPLAN_GEN_ALL_CORE_FT
PRINCIPAL DISCHARGE DIAGNOSIS  Diagnosis: COVID-19  Assessment and Plan of Treatment: You have been diagnosed with the COVID-19 virus during your hospital stay. You must self quarantine to complete a 10 day time period starting 11/11.  Monitor for fevers, shortness of breath and cough primarily.    It has been determined that you no longer need hospitalization and can recover while remaining in self-quarantine at home. You should follow the prevention steps below until a healthcare provider or local or state health department says you can return to your normal activities.  1. You should restrict activities outside your home, except for getting medical care.  2. Do not go to work, school, or public areas.  3. Avoid using public transportation, ride-sharing, or taxis.  4. Separate yourself from other people and animals in your home.  5. Call ahead before visiting your doctor.  6. Wear a facemask.  7. Cover your coughs and sneezes.  8. Clean your hands often.  9. Avoid sharing personal household items.  10. Clean all “high-touch” surfaces everyday.  11. Monitor your symptoms.  If you have a medical emergency and need to call 911, notify the dispatch personnel that you have COVID-19 If possible, put on a facemask before emergency medical services arrive.  12. Stopping home isolation.  Patients with confirmed COVID-19 should remain under home isolation precautions for 14 days since the positive COVID-19 test and until the risk of secondary transmission to others is thought to be low. The decision to discontinue home isolation precautions should be made on a case-by-case basis, in consultation with healthcare providers and state and local health departments. Your [facility] Department of Health can be reached at [phone] for further information about COVID-19.      SECONDARY DISCHARGE DIAGNOSES  Diagnosis: HTN (hypertension)  Assessment and Plan of Treatment: Continue with your new blood pressure medications as directed. It is important to remain compliant with these medications to avoid further complications from high blood pressure in the future. Monitor for any visual changes, headaches or dizziness.  Monitor blood pressure regularly.  Follow up with your PCP for further management for high blood pressure.    Diagnosis: THAD (acute kidney injury)  Assessment and Plan of Treatment: - You came in with elevated kidney function tests, which was lilkely due to dehyrdration as it improved with fluid rescusitation. There may be chronic kidney disease in the setting of high blood pressure. stay hydrated and avoid nephrotoxins such as advil. Please follow up with your primary care provider in 1-2 weeks following discharge from the hospital for continued monitoring and management.    Diagnosis: Abnormal echocardiogram  Assessment and Plan of Treatment: We did an ultrasound of your heart, which found evidence of abnormal wall motions. Cardiology evaluated you and found that these results alexandru be in setting of COVID or elevated BP but not likely due to a heart attack. Continue blood pressure medication regimen as directed. Follow up with cardiology as an outpatient for further management and evaluation.     PRINCIPAL DISCHARGE DIAGNOSIS  Diagnosis: COVID-19  Assessment and Plan of Treatment: You have been diagnosed with the COVID-19 virus during your hospital stay. You must self quarantine to complete a 10 day time period starting 11/11.  Monitor for fevers, shortness of breath and cough primarily.    It has been determined that you no longer need hospitalization and can recover while remaining in self-quarantine at home.   - You should restrict activities outside your home, except for getting medical care.  - Wear a facemask. Cover your coughs and sneezes.  - Clean your hands often.  - Monitor your symptoms.  You should remain under home isolation precautions for 10 days since the positive COVID-19 test and until the risk of secondary transmission to others is thought to be low.      SECONDARY DISCHARGE DIAGNOSES  Diagnosis: HTN (hypertension)  Assessment and Plan of Treatment: Continue with your new blood pressure medications as directed. It is important to remain compliant with these medications to avoid further complications from high blood pressure in the future. Monitor for any visual changes, headaches or dizziness.  Monitor blood pressure regularly.  Follow up with your PCP for further management for high blood pressure    Diagnosis: THAD (acute kidney injury)  Assessment and Plan of Treatment: - You came in with elevated kidney function tests, which was lilkely due to dehyrdration as it improved with fluid rescusitation. There may be chronic kidney disease in the setting of high blood pressure. Stay hydrated and avoid nephrotoxins such as advil. Please follow up with your primary care provider in 1-2 weeks following discharge from the hospital for continued monitoring and management.    Diagnosis: Abnormal echocardiogram  Assessment and Plan of Treatment: We did an ultrasound of your heart, which found evidence of abnormal wall motions. Cardiology evaluated you and found that these results may be in setting of COVID or elevated Blood pressure but not likely due to a heart attack. Continue blood pressure medication regimen as directed. Follow up with cardiology as an outpatient for further management and evaluation.

## 2022-11-12 NOTE — CONSULT NOTE ADULT - SUBJECTIVE AND OBJECTIVE BOX
Date of Admission: 11/11/2022    CHIEF COMPLAINT: headache    HISTORY OF PRESENT ILLNESS: 31 y/o AAM with HTN but noncompliant with medications presents with 3-4 day h/o headache and neck pain associated with nausea and some lightheadedness. He had a fever to 103 on admission and was found to be COVID +. Also with THAD which has resolved and uncontrolled BP. Patient at present is feeling well and is anxious to go home. He denies any other cardiac hx, no chest pain or SOB. There was no delta change with troponins but EKG was concerning for lateral TWI without an EKG for comparison but no ischemic changes. An ECHO was done which showed mild segmental LV dysfunction with hypokinesis of lateral and inferolat wall with EF 50%. BP has been labile and difficult to control and he is currently on amlodipine and HCTZ. Cardiology is consulted for abnormal ECHO findings.       Allergies    No Known Allergies    Intolerances    	    MEDICATIONS:  amLODIPine   Tablet 5 milliGRAM(s) Oral daily  enoxaparin Injectable 40 milliGRAM(s) SubCutaneous every 24 hours        acetaminophen     Tablet .. 650 milliGRAM(s) Oral every 6 hours PRN        influenza   Vaccine 0.5 milliLiter(s) IntraMuscular once  sodium chloride 0.9%. 1000 milliLiter(s) IV Continuous <Continuous>      PAST MEDICAL & SURGICAL HISTORY:  Hypertension      No significant past surgical history          FAMILY HISTORY:  Family history of sickle cell disease (Sibling)        SOCIAL HISTORY:    Smoker  denies  Alcohol denies  Drugs denies      REVIEW OF SYSTEMS:  See HPI. Otherwise, 10 point ROS done and otherwise negative.    PHYSICAL EXAM:  T(C): 37.1 (11-12-22 @ 11:00), Max: 37.1 (11-12-22 @ 11:00)  HR: 88 (11-12-22 @ 11:00) (78 - 98)  BP: 148/100 (11-12-22 @ 11:02) (148/99 - 172/125)  RR: 17 (11-12-22 @ 11:00) (17 - 19)  SpO2: 99% (11-12-22 @ 11:00) (98% - 100%)  Wt(kg): --  I&O's Summary      Appearance: NAD, skinn W & D	  HEENT:   Normal oral mucosa, PERRL, EOMI	  Cardiovascular: Normal S1 S2, No JVD, No murmurs, No edema  Respiratory: Lungs clear to auscultation	  Psychiatry: A & O x 3, Mood & affect appropriate  Gastrointestinal:  Soft, Non-tender, + BS	  Skin: No rashes, No ecchymoses, No cyanosis	  Neurologic: Non-focal  Extremities: no C/C/E      LABS:	 	                        15.3   3.49  )-----------( 199      ( 12 Nov 2022 08:00 )             44.6       11-12    137  |  101  |  12  ----------------------------<  93  3.8   |  23  |  1.27  11-11    138  |  104  |  11  ----------------------------<  120<H>  3.3<L>   |  25  |  1.33<H>      Ca    9.2      12 Nov 2022 08:00  Ca    9.0      11 Nov 2022 10:47  Phos  3.6     11-12  Phos  2.2     11-11  Mg     1.80     11-12  Mg     1.70     11-11    TPro  7.5  /  Alb  4.1  /  TBili  0.4  /  DBili  x   /  AST  22  /  ALT  23  /  AlkPhos  73  11-12  TPro  7.0  /  Alb  4.0  /  TBili  0.4  /  DBili  x   /  AST  21  /  ALT  22  /  AlkPhos  71  11-11        CARDIAC MARKERS: troponin 26 and 24  ESR 7   CRP 19.9    TELEMETRY: 	NSR     ECG:  NSR with TWI in lateral leads, no ischemic changes  	      PREVIOUS DIAGNOSTIC TESTING:    [ ] Echocardiogram:    Patient name: FLAVIO GEORGE  YOB: 1990   Age: 32 (M)   MR#: [id number]  Study Date: 11/12/2022  Location: [id number]: JESSICA Gomez  Study quality: Technically good  Referring Physician: Sara Saldaña MD  Blood Pressure: 148/100 mmHg  Height: 170 cm  Weight: 91 kg  BSA: 2 m2  ------------------------------------------------------------------------  PROCEDURE: Transthoracic echocardiogram with 2-D, M-Mode  and complete spectral and color flow Doppler.  INDICATION: Abnormal electrocardiogram (ECG) (EKG) (R94.31)  ------------------------------------------------------------------------  DIMENSIONS:  Dimensions:     Normal Values:  LA:     3.1 cm    2.0 - 4.0 cm  Ao:     3.5 cm    2.0 - 3.8 cm  SEPTUM: 1.0 cm    0.6 - 1.2 cm  PWT:    1.0 cm    0.6 - 1.1 cm  LVIDd:  5.3 cm    3.0 - 5.6 cm  LVIDs:  3.6 cm    1.8 - 4.0 cm  Derived Variables:  LVMI: 99 g/m2  RWT: 0.37  Fractional short: 32 %  Ejection Fraction (Visual Estimate): 50 %  ------------------------------------------------------------------------  OBSERVATIONS:  Mitral Valve: Normal mitral valve. Minimal mitral  regurgitation.  Aortic Root: Normal aortic root.  Aortic Valve: Normal trileaflet aortic valve.  Left Atrium: Normal left atrium. LA volume index = 27  cc/m2.  Left Ventricle: Mild segmental left ventricular systolic  dysfunction with hypokinesis of the lateral and  inferolateral wall.  Normal left ventricular internal  dimensions and wall thicknesses.  Right Heart: Normal right atrium. Normal right ventricular  size and function. Normal tricuspid valve. Normal pulmonic  valve.  Pericardium/PleuraNormal pericardium with no pericardial  effusion.  ------------------------------------------------------------------------  CONCLUSIONS:  1. Mild segmental left ventricular systolic dysfunction  with hypokinesis of the lateral and inferolateral wall.  2. Normal right ventricular size and function.  ------------------------------------------------------------------------  Confirmed on  11/12/2022 - 14:07:32 by Cara Miranda MD  ------------------------------------------------------------------------

## 2022-11-12 NOTE — CONSULT NOTE ADULT - ASSESSMENT
33 y/o AAM with HTN but noncompliant with medications presents with 3-4 day h/o headache and neck pain associated with nausea and some lightheadedness. Found to be COVID + with THAD and uncontrolled BP. Cardiology is consulted for abnormal ECHO findings in setting of COVID and uncontrolled BP.    Discussed with Dr. Whitaker, cardiology attending: abnormal ECHO finding may be in setting of COVID or elevated BP but not likely ischemic. Would continue to control BP with HCTZ and amlodipine and add ACE/ARB. Check thyroid function and proBNP. If necessary an outpatient cardiac workup can be pursued.            31 y/o AAM with HTN but noncompliant with medications presents with 3-4 day h/o headache and neck pain associated with nausea and some lightheadedness. Found to be COVID + with THAD and uncontrolled BP. Cardiology is consulted for abnormal ECHO findings in setting of COVID and uncontrolled BP.    Discussed with Dr. Whitaker, cardiology attending: abnormal ECHO finding may be in setting of COVID or elevated BP but not likely ischemic. Would continue to control BP with HCTZ and amlodipine and if renal function remains stable would add ACE/ARB. Check thyroid function and proBNP. If necessary an outpatient cardiac workup can be considered.

## 2022-11-12 NOTE — CONSULT NOTE ADULT - NS ATTEND AMEND GEN_ALL_CORE FT
Jaquan Baker is a 32 year old man with h/o HTN with variable medication compliance. He presented with 3-4 day h/o headache and neck pain associated with nausea and lightheadedness. He was COVID + with THAD and uncontrolled BP. Cardiology is consulted for abnormal ECHO findings in setting of COVID and uncontrolled BP. BP BP: 160/109 mm Hg with range on 11/12/22, 148/100 - 167/123 mm Hg. Serum creatinine 1.27 mg/dL. TTE 11/12/22 noted mild segmental left ventricular systolic dysfunction (LVEF 50%), with hypokinesis of the lateral and inferolateral wall LV size and wall thicknesses were normal. These TTE findings in setting of COVID infection may be due to myocarditis, though currently no elevation of HS Trop T. Check TSH and serum proBNP.  Management for BP includes HCTZ and amlodipine. If renal function remains stable would add ACE/ARB.

## 2022-11-12 NOTE — CONSULT NOTE ADULT - TIME BILLING
Jaquan Baker is a 32 year old man with h/o HTN with variable medication compliance. He presented with 3-4 day h/o headache and neck pain associated with nausea and lightheadedness. He was COVID + with THAD and uncontrolled BP.  BP: 160/109 mm Hg with range on 11/12/22, 148/100 - 167/123 mm Hg. Serum creatinine 1.27 mg/dL. TTE 11/12/22 noted mild segmental left ventricular systolic dysfunction (LVEF 50%)

## 2022-11-12 NOTE — DISCHARGE NOTE PROVIDER - CARE PROVIDER_API CALL
Primary Care provider,   Phone: (   )    -  Fax: (   )    -  Follow Up Time:    Primary Care provider,   Phone: (   )    -  Fax: (   )    -  Follow Up Time:     Margarito Whitaker (MD)  Cardiovascular Disease; Internal Medicine  [id number] [place] [address], [place] [address]  Phone: [phone]  Fax: [phone]  Follow Up Time:

## 2022-11-12 NOTE — PROGRESS NOTE ADULT - SUBJECTIVE AND OBJECTIVE BOX
****THIS NOTE IS IN PROGRESS ****     INTERVAL HPI/OVERNIGHT EVENTS:  Patient was seen and examined at bedside. As per nurse and patient, no o/n events, patient resting comfortably. No complaints at this time. Patient denies: fever, chills, dizziness, weakness, HA, Changes in vision, CP, palpitations, SOB, cough, N/V/D/C, dysuria, changes in bowel movements, LE edema. ROS otherwise negative.    Vital Signs Last 24 Hrs  T(C): 36.7 (2022 05:00), Max: 36.9 (2022 20:32)  T(F): 98 (2022 05:00), Max: 98.5 (2022 20:32)  HR: 79 (2022 05:00) (72 - 98)  BP: 157/113 (2022 05:00) (148/93 - 172/125)  BP(mean): --  RR: 18 (2022 05:00) (18 - 19)  SpO2: 100% (2022 05:00) (98% - 100%)    Parameters below as of 2022 05:00  Patient On (Oxygen Delivery Method): room air    CAPILLARY BLOOD GLUCOSE    PHYSICAL EXAM:  Constitutional: WDWN, NAD  HEENT: PERRL, EOMI, sclera non-icteric, neck supple, trachea midline, no masses, no JVD, MMM, good dentition  Respiratory: CTA b/l, good air entry b/l, no wheezing, no rhonchi, no rales, without accessory muscle use and no intercostal retractions  Cardiovascular: RRR, normal S1S2, no M/R/G  Gastrointestinal: soft, NTND, no masses palpable, BS normal  Extremities: Warm, well perfused, pulses equal bilateral upper and lower extremities, no edema, no clubbing  Neurological: AAOx3, CN Grossly intact  Skin: Normal temperature, warm, dry    MEDICATIONS  (STANDING):  amLODIPine   Tablet 5 milliGRAM(s) Oral daily  enoxaparin Injectable 40 milliGRAM(s) SubCutaneous every 24 hours  influenza   Vaccine 0.5 milliLiter(s) IntraMuscular once  sodium chloride 0.9%. 1000 milliLiter(s) (75 mL/Hr) IV Continuous <Continuous>    MEDICATIONS  (PRN):  acetaminophen     Tablet .. 650 milliGRAM(s) Oral every 6 hours PRN Severe Pain (7 - 10)      Allergies    No Known Allergies    Intolerances        LABS:                        15.3   3.49  )-----------( 199      ( 2022 08:00 )             44.6     -12    137  |  101  |  12  ----------------------------<  93  3.8   |  23  |  1.27    Ca    9.2      2022 08:00  Phos  3.6     11-12  Mg     1.80         TPro  7.5  /  Alb  4.1  /  TBili  0.4  /  DBili  x   /  AST  22  /  ALT  23  /  AlkPhos  73  11-12      Urinalysis Basic - ( 2022 00:30 )    Color: Light Yellow / Appearance: Clear / S.040 / pH: x  Gluc: x / Ketone: Negative  / Bili: Negative / Urobili: <2 mg/dL   Blood: x / Protein: 30 mg/dL / Nitrite: Negative   Leuk Esterase: Negative / RBC: 4 /HPF / WBC 4 /HPF   Sq Epi: x / Non Sq Epi: 2 /HPF / Bacteria: Few    Ucx negative     Urine Na 134; Urine crt 39     COVID positive       RADIOLOGY & ADDITIONAL TESTS:  Reviewed INTERVAL HPI/OVERNIGHT EVENTS:  Patient seen and examined at bedside. States he feels well. Denies chest pain, shortness of breath. ROS otherwise negative.     Vital Signs Last 24 Hrs  T(C): 36.7 (2022 05:00), Max: 36.9 (2022 20:32)  T(F): 98 (2022 05:00), Max: 98.5 (2022 20:32)  HR: 79 (2022 05:00) (72 - 98)  BP: 157/113 (2022 05:00) (148/93 - 172/125)  BP(mean): --  RR: 18 (2022 05:00) (18 - 19)  SpO2: 100% (2022 05:00) (98% - 100%)    Parameters below as of 2022 05:00  Patient On (Oxygen Delivery Method): room air    CAPILLARY BLOOD GLUCOSE    PHYSICAL EXAM:  Constitutional: Young male, NAD  HEENT: EOMI, sclera non-icteric, neck supple, trachea midline, no masses, no JVD, MMM  Respiratory: CTA b/l, good air entry b/l, no wheezing, no rhonchi, no rales, without accessory muscle use and no intercostal retractions  Cardiovascular: RRR, normal S1S2, no M/R/G  Gastrointestinal: soft, NTND, no masses palpable, BS normal  Extremities: Warm, well perfused, pulses equal bilateral upper and lower extremities, no edema, no clubbing  Neurological: AAOx3, CN Grossly intact  Skin: Normal temperature, warm, dry    MEDICATIONS  (STANDING):  amLODIPine   Tablet 5 milliGRAM(s) Oral daily  enoxaparin Injectable 40 milliGRAM(s) SubCutaneous every 24 hours  influenza   Vaccine 0.5 milliLiter(s) IntraMuscular once  sodium chloride 0.9%. 1000 milliLiter(s) (75 mL/Hr) IV Continuous <Continuous>    MEDICATIONS  (PRN):  acetaminophen     Tablet .. 650 milliGRAM(s) Oral every 6 hours PRN Severe Pain (7 - 10)      Allergies    No Known Allergies    Intolerances        LABS:                        15.3   3.49  )-----------( 199      ( 2022 08:00 )             44.6     -12    137  |  101  |  12  ----------------------------<  93  3.8   |  23  |  1.27    Ca    9.2      2022 08:00  Phos  3.6     11-  Mg     1.80         TPro  7.5  /  Alb  4.1  /  TBili  0.4  /  DBili  x   /  AST  22  /  ALT  23  /  AlkPhos  73  -      Urinalysis Basic - ( 2022 00:30 )    Color: Light Yellow / Appearance: Clear / S.040 / pH: x  Gluc: x / Ketone: Negative  / Bili: Negative / Urobili: <2 mg/dL   Blood: x / Protein: 30 mg/dL / Nitrite: Negative   Leuk Esterase: Negative / RBC: 4 /HPF / WBC 4 /HPF   Sq Epi: x / Non Sq Epi: 2 /HPF / Bacteria: Few    Ucx negative     Urine Na 134; Urine crt 39     COVID positive     ECHO:     CONCLUSIONS:  1. Mild segmental left ventricular systolic dysfunction  with hypokinesis of the lateral and inferolateral wall.  2. Normal right ventricular size and function.      RADIOLOGY & ADDITIONAL TESTS:  Reviewed

## 2022-11-12 NOTE — DISCHARGE NOTE PROVIDER - PROVIDER TOKENS
FREE:[LAST:[Primary Care provider],PHONE:[(   )    -],FAX:[(   )    -]] FREE:[LAST:[Primary Care provider],PHONE:[(   )    -],FAX:[(   )    -]],PROVIDER:[TOKEN:[[id number]]]

## 2022-11-12 NOTE — DISCHARGE NOTE PROVIDER - NSDCFUADDAPPT_GEN_ALL_CORE_FT
Please follow up with your primary care provider in 1-2 weeks following discharge from the hospital for continued monitoring and management. If you do not have a primary care provider please refer to the information for the internal medicine clinic to establish yourself as a patient.     Please call [phone] to arrange for follow up with a cardiologist.

## 2022-11-12 NOTE — PROGRESS NOTE ADULT - PROBLEM SELECTOR PLAN 1
- Patient with tmax of 103F documented in ED flowsheet, now afebrile s/p 2 doses of tylenol   - found to be covid positive x 2  - Given headaches and fever and complaint of neck pain, LP was discussed in ED to R/O meningitis however low suspicion given source of fever likely Covid, no nuchal rigidity, and neck pain appears to be MSK  - patient declined LP in ED; hold off for now   - d-dimer 150   - maintain contact/airborne isolation  - no respiratory complaints, would hold off on rem/dex for now given overall low risk in this young healthy patient and no hypoxia   - lovenox for DVT ppx per protocol   - follow UCx and BCx x 2 NGTD  - tylenol PRN fever - Patient with tmax of 103F documented in ED flowsheet, now afebrile s/p 2 doses of tylenol   - found to be covid positive x 2  - Given headaches and fever and complaint of neck pain, LP was discussed in ED to R/O meningitis however low suspicion given source of fever likely Covid, no nuchal rigidity, and neck pain appears to be MSK  - patient declined LP in ED; hold off for now   - d-dimer 150   - maintain contact/airborne isolation  - no respiratory complaints, would hold off on rem/dex for now given overall low risk in this young healthy patient and no hypoxia   - lovenox for DVT ppx per protocol   - follow UCx (negative) and BCx x 2 NGTD  - tylenol PRN fever

## 2022-11-13 VITALS — SYSTOLIC BLOOD PRESSURE: 152 MMHG | DIASTOLIC BLOOD PRESSURE: 102 MMHG

## 2022-11-13 LAB
ALBUMIN SERPL ELPH-MCNC: 4.2 G/DL — SIGNIFICANT CHANGE UP (ref 3.3–5)
ALP SERPL-CCNC: 73 U/L — SIGNIFICANT CHANGE UP (ref 40–120)
ALT FLD-CCNC: 21 U/L — SIGNIFICANT CHANGE UP (ref 4–41)
ANION GAP SERPL CALC-SCNC: 13 MMOL/L — SIGNIFICANT CHANGE UP (ref 7–14)
AST SERPL-CCNC: 17 U/L — SIGNIFICANT CHANGE UP (ref 4–40)
BILIRUB SERPL-MCNC: 0.3 MG/DL — SIGNIFICANT CHANGE UP (ref 0.2–1.2)
BUN SERPL-MCNC: 14 MG/DL — SIGNIFICANT CHANGE UP (ref 7–23)
CALCIUM SERPL-MCNC: 9.4 MG/DL — SIGNIFICANT CHANGE UP (ref 8.4–10.5)
CHLORIDE SERPL-SCNC: 99 MMOL/L — SIGNIFICANT CHANGE UP (ref 98–107)
CO2 SERPL-SCNC: 24 MMOL/L — SIGNIFICANT CHANGE UP (ref 22–31)
CREAT SERPL-MCNC: 1.38 MG/DL — HIGH (ref 0.5–1.3)
EGFR: 70 ML/MIN/1.73M2 — SIGNIFICANT CHANGE UP
GLUCOSE SERPL-MCNC: 106 MG/DL — HIGH (ref 70–99)
HCT VFR BLD CALC: 47.2 % — SIGNIFICANT CHANGE UP (ref 39–50)
HGB BLD-MCNC: 16.3 G/DL — SIGNIFICANT CHANGE UP (ref 13–17)
MAGNESIUM SERPL-MCNC: 2.1 MG/DL — SIGNIFICANT CHANGE UP (ref 1.6–2.6)
MCHC RBC-ENTMCNC: 27.1 PG — SIGNIFICANT CHANGE UP (ref 27–34)
MCHC RBC-ENTMCNC: 34.5 GM/DL — SIGNIFICANT CHANGE UP (ref 32–36)
MCV RBC AUTO: 78.5 FL — LOW (ref 80–100)
NRBC # BLD: 0 /100 WBCS — SIGNIFICANT CHANGE UP (ref 0–0)
NRBC # FLD: 0 K/UL — SIGNIFICANT CHANGE UP (ref 0–0)
NT-PROBNP SERPL-SCNC: 456 PG/ML — HIGH
PHOSPHATE SERPL-MCNC: 3.9 MG/DL — SIGNIFICANT CHANGE UP (ref 2.5–4.5)
PLATELET # BLD AUTO: 234 K/UL — SIGNIFICANT CHANGE UP (ref 150–400)
POTASSIUM SERPL-MCNC: 3.4 MMOL/L — LOW (ref 3.5–5.3)
POTASSIUM SERPL-SCNC: 3.4 MMOL/L — LOW (ref 3.5–5.3)
PROT SERPL-MCNC: 7.6 G/DL — SIGNIFICANT CHANGE UP (ref 6–8.3)
RBC # BLD: 6.01 M/UL — HIGH (ref 4.2–5.8)
RBC # FLD: 12.9 % — SIGNIFICANT CHANGE UP (ref 10.3–14.5)
SODIUM SERPL-SCNC: 136 MMOL/L — SIGNIFICANT CHANGE UP (ref 135–145)
WBC # BLD: 3.56 K/UL — LOW (ref 3.8–10.5)
WBC # FLD AUTO: 3.56 K/UL — LOW (ref 3.8–10.5)

## 2022-11-13 PROCEDURE — 99239 HOSP IP/OBS DSCHRG MGMT >30: CPT

## 2022-11-13 RX ORDER — POTASSIUM CHLORIDE 20 MEQ
40 PACKET (EA) ORAL ONCE
Refills: 0 | Status: COMPLETED | OUTPATIENT
Start: 2022-11-13 | End: 2022-11-13

## 2022-11-13 RX ORDER — AMLODIPINE BESYLATE 2.5 MG/1
1 TABLET ORAL
Qty: 30 | Refills: 0
Start: 2022-11-13 | End: 2022-12-12

## 2022-11-13 RX ADMIN — AMLODIPINE BESYLATE 10 MILLIGRAM(S): 2.5 TABLET ORAL at 05:04

## 2022-11-13 RX ADMIN — Medication 12.5 MILLIGRAM(S): at 05:03

## 2022-11-13 NOTE — DISCHARGE NOTE NURSING/CASE MANAGEMENT/SOCIAL WORK - NURSING SECTION COMPLETE
Patient/Caregiver provided printed discharge information. Unna Boot Text: An Unna boot was placed to help immobilize the limb and facilitate more rapid healing.

## 2022-11-13 NOTE — PROGRESS NOTE ADULT - PROBLEM SELECTOR PLAN 7
- EKG showing NSR @ 89bpm, QTc 481, nonspecific TW changes with biphasic TW V5-V6, avL, and II; no apparent acute ischemic changes, no prior EKG to compare to   - no current cardiac complaints  - ECHO abnormalities as above---> cardiology consulted recs appreciated likely in setting of COVID and elevated BP

## 2022-11-13 NOTE — DISCHARGE NOTE NURSING/CASE MANAGEMENT/SOCIAL WORK - NSDCPEFALRISK_GEN_ALL_CORE
For information on Fall & Injury Prevention, visit: [email or web address] OR  [email or web address] OR  https://www.cdc.gov/steadi/patient.html

## 2022-11-13 NOTE — PROGRESS NOTE ADULT - PROBLEM SELECTOR PLAN 5
- Patients creatinine 1.57, no prior labs to compare to; crt today 1.38  - no prior hx of kidney disease per patient  - no urinary complaints   - likely acute due to dehydration and viral infection; likely component of intrinsic disease in setting of HTN  - avoid nephrotoxins

## 2022-11-13 NOTE — PROGRESS NOTE ADULT - PROBLEM SELECTOR PLAN 4
- Patient with sensation of feeling light headed yesterday and transiently saw black spots in vision  - he states that this has happened to him before when his pressure has been high  - CTH and CTA neg   - visual acuity in ED 20/20 bilaterally  - no further visual complaints  - BP control   - if recurrent symptoms would consult opthalmology
- Patient with sensation of feeling light headed yesterday and transiently saw black spots in vision  - he states that this has happened to him before when his pressure has been high  - CTH and CTA neg   - visual acuity in ED 20/20 bilaterally  - no further visual complaints  - BP control   - if recurrent symptoms would consult opthalmology

## 2022-11-13 NOTE — PROGRESS NOTE ADULT - PROBLEM SELECTOR PLAN 6
- BP as high as 183/105 in ED, now improved but still elevated  - Patient is not on any BP meds, he stopped taking them a long time ago as he felt he didn't need them and felt he was gaining weight on them  - he was prescribed Amlodipine/Benazapril 10-40mg daily in February 2022   - amlodipine 10mg + hctz 12.5 mg   - continue to encourage compliance and will need outpatient follow up - BP as high as 183/105 in ED, now improved but still elevated  - Patient is not on any BP meds, he stopped taking them a long time ago as he felt he didn't need them and felt he was gaining weight on them  - he was prescribed Amlodipine/Benazapril 10-40mg daily in February 2022   - amlodipine 10mg + hctz 12.5 mg [this will be his discharge regimen as well0  - continue to encourage compliance and will need outpatient follow up w/ PMD and cardiology - BP as high as 183/105 in ED, now improved but still elevated  - Patient is not on any BP meds, he stopped taking them a long time ago as he felt he didn't need them and felt he was gaining weight on them  - he was prescribed Amlodipine/Benazapril 10-40mg daily in February 2022   - amlodipine 10mg + hctz 12.5 mg [this will be his discharge regimen as well]  - continue to encourage compliance and will need outpatient follow up w/ PMD and cardiology

## 2022-11-13 NOTE — PROGRESS NOTE ADULT - PROBLEM SELECTOR PLAN 8
- DVT ppx with Lovenox 40mg daily given covid infection per protocol     Case Discussed w/ ACP - DVT ppx with Lovenox 40mg daily given covid infection per protocol     Case Discussed w/ ACP    >35 minutes spent on d'c planning

## 2022-11-13 NOTE — PROGRESS NOTE ADULT - SUBJECTIVE AND OBJECTIVE BOX
****THIS NOTE IS INCOMPLETE PENDING PHYSICAL EXAM ****     INTERVAL HPI/OVERNIGHT EVENTS:  Patient seen and examined at bedside. States he feels well. Denies chest pain, shortness of breath. ROS otherwise negative.     Vital Signs Last 24 Hrs  T(C): 36.6 (2022 05:00), Max: 37.1 (2022 11:00)  T(F): 97.9 (2022 05:00), Max: 98.8 (2022 11:00)  HR: 72 (2022 05:00) (71 - 88)  BP: 148/97 (2022 05:00) (148/97 - 167/123)  BP(mean): --  RR: 17 (2022 05:00) (16 - 18)  SpO2: 98% (2022 05:00) (98% - 100%)    Parameters below as of 2022 05:00  Patient On (Oxygen Delivery Method): room air    CAPILLARY BLOOD GLUCOSE    PHYSICAL EXAM:  Constitutional: Young male, NAD  HEENT: EOMI, sclera non-icteric, neck supple, trachea midline, no masses, no JVD, MMM  Respiratory: CTA b/l, good air entry b/l, no wheezing, no rhonchi, no rales, without accessory muscle use and no intercostal retractions  Cardiovascular: RRR, normal S1S2, no M/R/G  Gastrointestinal: soft, NTND, no masses palpable, BS normal  Extremities: Warm, well perfused, pulses equal bilateral upper and lower extremities, no edema, no clubbing  Neurological: AAOx3, CN Grossly intact  Skin: Normal temperature, warm, dry    MEDICATIONS  (STANDING):  amLODIPine   Tablet 10 milliGRAM(s) Oral daily  enoxaparin Injectable 40 milliGRAM(s) SubCutaneous every 24 hours  hydrochlorothiazide 12.5 milliGRAM(s) Oral daily  influenza   Vaccine 0.5 milliLiter(s) IntraMuscular once  sodium chloride 0.9%. 1000 milliLiter(s) (75 mL/Hr) IV Continuous <Continuous>    MEDICATIONS  (PRN):  acetaminophen     Tablet .. 650 milliGRAM(s) Oral every 6 hours PRN Severe Pain (7 - 10)        Allergies    No Known Allergies    Intolerances    LABS:                         16.3   3.56  )-----------( 234      ( 2022 06:44 )             47.2         136  |  99  |  14  ----------------------------<  106<H>  3.4<L>   |  24  |  1.38<H>    Ca    9.4      2022 06:44  Phos  3.9       Mg     2.10         TPro  7.6  /  Alb  4.2  /  TBili  0.3  /  DBili  x   /  AST  17  /  ALT  21  /  AlkPhos  73              Serum Pro-Brain Natriuretic Peptide: 456 pg/mL ( @ 06:44)        RADIOLOGY, EKG & ADDITIONAL TESTS: Reviewed.       Urinalysis Basic - ( 2022 00:30 )    Color: Light Yellow / Appearance: Clear / S.040 / pH: x  Gluc: x / Ketone: Negative  / Bili: Negative / Urobili: <2 mg/dL   Blood: x / Protein: 30 mg/dL / Nitrite: Negative   Leuk Esterase: Negative / RBC: 4 /HPF / WBC 4 /HPF   Sq Epi: x / Non Sq Epi: 2 /HPF / Bacteria: Few    Ucx negative     Urine Na 134; Urine crt 39     COVID positive     ECHO:     CONCLUSIONS:  1. Mild segmental left ventricular systolic dysfunction  with hypokinesis of the lateral and inferolateral wall.  2. Normal right ventricular size and function.      RADIOLOGY & ADDITIONAL TESTS:  Reviewed INTERVAL HPI/OVERNIGHT EVENTS:  Patient seen and examined at bedside. States he feels well. Denies chest pain, shortness of breath. ROS otherwise negative.     Vital Signs Last 24 Hrs  T(C): 36.6 (2022 05:00), Max: 37.1 (2022 11:00)  T(F): 97.9 (2022 05:00), Max: 98.8 (2022 11:00)  HR: 72 (2022 05:00) (71 - 88)  BP: 148/97 (2022 05:00) (148/97 - 167/123)  BP(mean): --  RR: 17 (2022 05:00) (16 - 18)  SpO2: 98% (:00) (98% - 100%)    Parameters below as of 2022 05:00  Patient On (Oxygen Delivery Method): room air    CAPILLARY BLOOD GLUCOSE    PHYSICAL EXAM:  Constitutional: Young male, NAD  HEENT: EOMI, sclera non-icteric, neck supple, trachea midline, no masses, no JVD, MMM  Respiratory: CTA b/l, good air entry b/l, no wheezing, no rhonchi, no rales, without accessory muscle use and no intercostal retractions  Cardiovascular: RRR, normal S1S2, no M/R/G  Gastrointestinal: soft, NTND, no masses palpable, BS normal  Extremities: Warm, well perfused, pulses equal bilateral upper and lower extremities, no edema, no clubbing  Neurological: AAOx3, CN Grossly intact  Skin: Normal temperature, warm, dry    MEDICATIONS  (STANDING):  amLODIPine   Tablet 10 milliGRAM(s) Oral daily  enoxaparin Injectable 40 milliGRAM(s) SubCutaneous every 24 hours  hydrochlorothiazide 12.5 milliGRAM(s) Oral daily  influenza   Vaccine 0.5 milliLiter(s) IntraMuscular once  sodium chloride 0.9%. 1000 milliLiter(s) (75 mL/Hr) IV Continuous <Continuous>    MEDICATIONS  (PRN):  acetaminophen     Tablet .. 650 milliGRAM(s) Oral every 6 hours PRN Severe Pain (7 - 10)        Allergies    No Known Allergies    Intolerances    LABS:                         16.3   3.56  )-----------( 234      ( 2022 06:44 )             47.2     11-13    136  |  99  |  14  ----------------------------<  106<H>  3.4<L>   |  24  |  1.38<H>    Ca    9.4      2022 06:44  Phos  3.9       Mg     2.10         TPro  7.6  /  Alb  4.2  /  TBili  0.3  /  DBili  x   /  AST  17  /  ALT  21  /  AlkPhos  73              Serum Pro-Brain Natriuretic Peptide: 456 pg/mL ( @ 06:44)        RADIOLOGY, EKG & ADDITIONAL TESTS: Reviewed.       Urinalysis Basic - ( 2022 00:30 )    Color: Light Yellow / Appearance: Clear / S.040 / pH: x  Gluc: x / Ketone: Negative  / Bili: Negative / Urobili: <2 mg/dL   Blood: x / Protein: 30 mg/dL / Nitrite: Negative   Leuk Esterase: Negative / RBC: 4 /HPF / WBC 4 /HPF   Sq Epi: x / Non Sq Epi: 2 /HPF / Bacteria: Few    Ucx negative     Urine Na 134; Urine crt 39     COVID positive     ECHO:     CONCLUSIONS:  1. Mild segmental left ventricular systolic dysfunction  with hypokinesis of the lateral and inferolateral wall.  2. Normal right ventricular size and function.      RADIOLOGY & ADDITIONAL TESTS:  Reviewed

## 2022-11-13 NOTE — PROGRESS NOTE ADULT - PROBLEM SELECTOR PLAN 3
- Patient's N/V has resolved, no further diarrhea as well  - unclear etiology, possible in setting of viral infection  - abdominal exam is benign   - monitor clinically  - if need can use zofran PRN
- Patient's N/V has resolved, no further diarrhea as well  - unclear etiology, possible in setting of viral infection  - abdominal exam is benign   - monitor clinically  - if need can use zofran PRN

## 2022-11-13 NOTE — PROGRESS NOTE ADULT - ASSESSMENT
32M PMHx HTN (non compliant with meds) who presents with 3-4 day history of headache and neck pain and 1 day history of N/V, light headedness. Found to be febrile to 103F and Covid positive with THAD and uncontrolled BP. 
32M PMHx HTN (non compliant with meds) who presents with 3-4 day history of headache and neck pain and 1 day history of N/V, light headedness. Found to be febrile to 103F and Covid positive with THAD and uncontrolled BP.

## 2022-11-13 NOTE — PROGRESS NOTE ADULT - PROBLEM SELECTOR PLAN 2
RESOLVED on 11/12 AM exam   - improved w/ Tylenol and Reglan   - CTH and CTA H/N is negative for acute findings  - Neuro exam grossly non focal  - suspect headache likely in setting of viral infection and dehydration   - neck pain appears to paraspinal muscle soreness, possibly from sleep differently on new pillows; has full ROM of neck without stiffness; continue to monitor   - Tylenol prn

## 2022-11-13 NOTE — DISCHARGE NOTE NURSING/CASE MANAGEMENT/SOCIAL WORK - PATIENT PORTAL LINK FT
You can access the FollowMyHealth Patient Portal offered by [facility] by registering at the following website: [email or web address]. By joining [employer]’s FollowMyHealth portal, you will also be able to view your health information using other applications (apps) compatible with our system.

## 2022-11-13 NOTE — PROGRESS NOTE ADULT - PROBLEM SELECTOR PLAN 1
- Patient with tmax of 103F documented in ED flowsheet, now afebrile s/p 2 doses of tylenol   - found to be covid positive x 2  - Given headaches and fever and complaint of neck pain, LP was discussed in ED to R/O meningitis however low suspicion given source of fever likely Covid, no nuchal rigidity, and neck pain appears to be MSK  - patient declined LP in ED; hold off for now   - d-dimer 150   - maintain contact/airborne isolation  - no respiratory complaints, would hold off on rem/dex for now given overall low risk in this young healthy patient and no hypoxia   - lovenox for DVT ppx per protocol   - follow UCx (negative) and BCx x 2 NGTD  - tylenol PRN fever

## 2022-11-13 NOTE — DISCHARGE NOTE NURSING/CASE MANAGEMENT/SOCIAL WORK - NSDCFUADDAPPT_GEN_ALL_CORE_FT
Please follow up with your primary care provider in 1-2 weeks following discharge from the hospital for continued monitoring and management. If you do not have a primary care provider please refer to the information for the internal medicine clinic to establish yourself as a patient.     Please call [phone] to arrange for follow up with a cardiologist.

## 2022-11-16 LAB
CULTURE RESULTS: SIGNIFICANT CHANGE UP
CULTURE RESULTS: SIGNIFICANT CHANGE UP
SPECIMEN SOURCE: SIGNIFICANT CHANGE UP
SPECIMEN SOURCE: SIGNIFICANT CHANGE UP

## 2023-05-19 NOTE — DISCHARGE NOTE PROVIDER - NSDCMRMEDTOKEN_GEN_ALL_CORE_FT
amLODIPine 5 mg oral tablet: 1 tab(s) orally once a day  hydroCHLOROthiazide 12.5 mg oral capsule: 1 cap(s) orally once a day  Tylenol: Takes tylenol PRN as needed    Yes amLODIPine 10 mg oral tablet: 1 tab(s) orally once a day  hydroCHLOROthiazide 12.5 mg oral capsule: 1 cap(s) orally once a day  Tylenol: Takes tylenol PRN as needed

## 2024-05-15 ENCOUNTER — EMERGENCY (EMERGENCY)
Facility: HOSPITAL | Age: 34
LOS: 1 days | Discharge: HOME | End: 2024-05-15
Attending: EMERGENCY MEDICINE | Admitting: EMERGENCY MEDICINE
Payer: MEDICAID

## 2024-05-15 VITALS
OXYGEN SATURATION: 100 % | RESPIRATION RATE: 18 BRPM | DIASTOLIC BLOOD PRESSURE: 78 MMHG | SYSTOLIC BLOOD PRESSURE: 154 MMHG | TEMPERATURE: 98 F | HEART RATE: 78 BPM

## 2024-05-15 VITALS
OXYGEN SATURATION: 100 % | DIASTOLIC BLOOD PRESSURE: 89 MMHG | HEART RATE: 88 BPM | SYSTOLIC BLOOD PRESSURE: 169 MMHG | RESPIRATION RATE: 18 BRPM | TEMPERATURE: 98 F

## 2024-05-15 PROBLEM — I10 ESSENTIAL (PRIMARY) HYPERTENSION: Chronic | Status: ACTIVE | Noted: 2022-11-11

## 2024-05-15 LAB
ALBUMIN SERPL ELPH-MCNC: 4.7 G/DL — SIGNIFICANT CHANGE UP (ref 3.3–5)
ALP SERPL-CCNC: 74 U/L — SIGNIFICANT CHANGE UP (ref 40–120)
ALT FLD-CCNC: 36 U/L — SIGNIFICANT CHANGE UP (ref 4–41)
ANION GAP SERPL CALC-SCNC: 11 MMOL/L — SIGNIFICANT CHANGE UP (ref 7–14)
ANION GAP SERPL CALC-SCNC: 14 MMOL/L — SIGNIFICANT CHANGE UP (ref 7–14)
APTT BLD: 35.2 SEC — SIGNIFICANT CHANGE UP (ref 24.5–35.6)
AST SERPL-CCNC: 54 U/L — HIGH (ref 4–40)
BASOPHILS # BLD AUTO: 0.03 K/UL — SIGNIFICANT CHANGE UP (ref 0–0.2)
BASOPHILS NFR BLD AUTO: 0.7 % — SIGNIFICANT CHANGE UP (ref 0–2)
BILIRUB SERPL-MCNC: 0.4 MG/DL — SIGNIFICANT CHANGE UP (ref 0.2–1.2)
BUN SERPL-MCNC: 15 MG/DL — SIGNIFICANT CHANGE UP (ref 7–23)
BUN SERPL-MCNC: 17 MG/DL — SIGNIFICANT CHANGE UP (ref 7–23)
CALCIUM SERPL-MCNC: 8.4 MG/DL — SIGNIFICANT CHANGE UP (ref 8.4–10.5)
CALCIUM SERPL-MCNC: 9.3 MG/DL — SIGNIFICANT CHANGE UP (ref 8.4–10.5)
CHLORIDE SERPL-SCNC: 103 MMOL/L — SIGNIFICANT CHANGE UP (ref 98–107)
CHLORIDE SERPL-SCNC: 103 MMOL/L — SIGNIFICANT CHANGE UP (ref 98–107)
CO2 SERPL-SCNC: 23 MMOL/L — SIGNIFICANT CHANGE UP (ref 22–31)
CO2 SERPL-SCNC: 27 MMOL/L — SIGNIFICANT CHANGE UP (ref 22–31)
CREAT SERPL-MCNC: 1.28 MG/DL — SIGNIFICANT CHANGE UP (ref 0.5–1.3)
CREAT SERPL-MCNC: 1.42 MG/DL — HIGH (ref 0.5–1.3)
EGFR: 67 ML/MIN/1.73M2 — SIGNIFICANT CHANGE UP
EGFR: 76 ML/MIN/1.73M2 — SIGNIFICANT CHANGE UP
EOSINOPHIL # BLD AUTO: 0.08 K/UL — SIGNIFICANT CHANGE UP (ref 0–0.5)
EOSINOPHIL NFR BLD AUTO: 1.8 % — SIGNIFICANT CHANGE UP (ref 0–6)
GLUCOSE SERPL-MCNC: 75 MG/DL — SIGNIFICANT CHANGE UP (ref 70–99)
GLUCOSE SERPL-MCNC: 82 MG/DL — SIGNIFICANT CHANGE UP (ref 70–99)
HCT VFR BLD CALC: 44.6 % — SIGNIFICANT CHANGE UP (ref 39–50)
HGB BLD-MCNC: 15.6 G/DL — SIGNIFICANT CHANGE UP (ref 13–17)
IANC: 2.13 K/UL — SIGNIFICANT CHANGE UP (ref 1.8–7.4)
IMM GRANULOCYTES NFR BLD AUTO: 0.2 % — SIGNIFICANT CHANGE UP (ref 0–0.9)
INR BLD: 1.01 RATIO — SIGNIFICANT CHANGE UP (ref 0.85–1.18)
LYMPHOCYTES # BLD AUTO: 1.6 K/UL — SIGNIFICANT CHANGE UP (ref 1–3.3)
LYMPHOCYTES # BLD AUTO: 36.8 % — SIGNIFICANT CHANGE UP (ref 13–44)
MAGNESIUM SERPL-MCNC: 1.8 MG/DL — SIGNIFICANT CHANGE UP (ref 1.6–2.6)
MCHC RBC-ENTMCNC: 27.6 PG — SIGNIFICANT CHANGE UP (ref 27–34)
MCHC RBC-ENTMCNC: 35 GM/DL — SIGNIFICANT CHANGE UP (ref 32–36)
MCV RBC AUTO: 78.8 FL — LOW (ref 80–100)
MONOCYTES # BLD AUTO: 0.5 K/UL — SIGNIFICANT CHANGE UP (ref 0–0.9)
MONOCYTES NFR BLD AUTO: 11.5 % — SIGNIFICANT CHANGE UP (ref 2–14)
NEUTROPHILS # BLD AUTO: 2.13 K/UL — SIGNIFICANT CHANGE UP (ref 1.8–7.4)
NEUTROPHILS NFR BLD AUTO: 49 % — SIGNIFICANT CHANGE UP (ref 43–77)
NRBC # BLD: 0 /100 WBCS — SIGNIFICANT CHANGE UP (ref 0–0)
NRBC # FLD: 0 K/UL — SIGNIFICANT CHANGE UP (ref 0–0)
NT-PROBNP SERPL-SCNC: 176 PG/ML — SIGNIFICANT CHANGE UP
PHOSPHATE SERPL-MCNC: 3.1 MG/DL — SIGNIFICANT CHANGE UP (ref 2.5–4.5)
PLATELET # BLD AUTO: 265 K/UL — SIGNIFICANT CHANGE UP (ref 150–400)
POTASSIUM SERPL-MCNC: 4.2 MMOL/L — SIGNIFICANT CHANGE UP (ref 3.5–5.3)
POTASSIUM SERPL-MCNC: 5.8 MMOL/L — HIGH (ref 3.5–5.3)
POTASSIUM SERPL-SCNC: 4.2 MMOL/L — SIGNIFICANT CHANGE UP (ref 3.5–5.3)
POTASSIUM SERPL-SCNC: 5.8 MMOL/L — HIGH (ref 3.5–5.3)
PROT SERPL-MCNC: 8.1 G/DL — SIGNIFICANT CHANGE UP (ref 6–8.3)
PROTHROM AB SERPL-ACNC: 11.3 SEC — SIGNIFICANT CHANGE UP (ref 9.5–13)
RBC # BLD: 5.66 M/UL — SIGNIFICANT CHANGE UP (ref 4.2–5.8)
RBC # FLD: 11.9 % — SIGNIFICANT CHANGE UP (ref 10.3–14.5)
SODIUM SERPL-SCNC: 140 MMOL/L — SIGNIFICANT CHANGE UP (ref 135–145)
SODIUM SERPL-SCNC: 141 MMOL/L — SIGNIFICANT CHANGE UP (ref 135–145)
TROPONIN T, HIGH SENSITIVITY RESULT: 43 NG/L — SIGNIFICANT CHANGE UP
TROPONIN T, HIGH SENSITIVITY RESULT: 44 NG/L — SIGNIFICANT CHANGE UP
WBC # BLD: 4.35 K/UL — SIGNIFICANT CHANGE UP (ref 3.8–10.5)
WBC # FLD AUTO: 4.35 K/UL — SIGNIFICANT CHANGE UP (ref 3.8–10.5)

## 2024-05-15 PROCEDURE — 70470 CT HEAD/BRAIN W/O & W/DYE: CPT | Mod: 26,MC

## 2024-05-15 PROCEDURE — 71046 X-RAY EXAM CHEST 2 VIEWS: CPT | Mod: 26

## 2024-05-15 PROCEDURE — 93010 ELECTROCARDIOGRAM REPORT: CPT

## 2024-05-15 PROCEDURE — 99285 EMERGENCY DEPT VISIT HI MDM: CPT

## 2024-05-15 RX ORDER — SODIUM CHLORIDE 9 MG/ML
1000 INJECTION, SOLUTION INTRAVENOUS ONCE
Refills: 0 | Status: COMPLETED | OUTPATIENT
Start: 2024-05-15 | End: 2024-05-15

## 2024-05-15 RX ORDER — SODIUM CHLORIDE 9 MG/ML
1000 INJECTION INTRAMUSCULAR; INTRAVENOUS; SUBCUTANEOUS ONCE
Refills: 0 | Status: COMPLETED | OUTPATIENT
Start: 2024-05-15 | End: 2024-05-15

## 2024-05-15 RX ORDER — METOCLOPRAMIDE HCL 10 MG
10 TABLET ORAL ONCE
Refills: 0 | Status: COMPLETED | OUTPATIENT
Start: 2024-05-15 | End: 2024-05-15

## 2024-05-15 RX ADMIN — SODIUM CHLORIDE 1000 MILLILITER(S): 9 INJECTION INTRAMUSCULAR; INTRAVENOUS; SUBCUTANEOUS at 15:32

## 2024-05-15 RX ADMIN — SODIUM CHLORIDE 1000 MILLILITER(S): 9 INJECTION, SOLUTION INTRAVENOUS at 17:05

## 2024-05-15 RX ADMIN — Medication 10 MILLIGRAM(S): at 15:32

## 2024-05-15 NOTE — ED ADULT NURSE REASSESSMENT NOTE - STATUS
has per handoff pt eval for c/o headache, elevated bp , short of breath x 3 days. Fluids and reglan given. Bp improved.

## 2024-05-15 NOTE — ED PROVIDER NOTE - ATTENDING CONTRIBUTION TO CARE
I performed the initial face to face bedside interview with this patient regarding history of present illness, review of symptoms and past medical, social and family history.  I completed an independent physical examination.  The history, review of symptoms and examination was documented by me.  I have discussed the patient’s plan of care and disposition with the fellow.

## 2024-05-15 NOTE — ED ADULT NURSE NOTE - DISCHARGE DATE/TIME
Take medications as prescribed. Follow up with your gynecologist in 2 days. Return to the emergency department for any increase in symptoms or for any other new or worrisome symptoms.      15-May-2024 19:33

## 2024-05-15 NOTE — ED PROVIDER NOTE - CLINICAL SUMMARY MEDICAL DECISION MAKING FREE TEXT BOX
Likely migraine headaches and uncontrolled hypertension, will repeat head ct to r/o PRESS in the setting of HTN and headache, and given previous history. Pt reports obtaining HTN refil today.  Will r/o end organ damage with cbc, cmp, trop, to r/o acs, and THAD.  Dispo pending.  .

## 2024-05-15 NOTE — ED PROVIDER NOTE - PHYSICAL EXAMINATION
GEN - NAD; well appearing; A+O x3; non-toxic appearing  HEAD: NCAT; EYES/NOSE: PERRLA, EOMI, no discharge; THROAT: Oral cavity and pharynx normal. No inflammation, swelling, exudate, or lesions.  NECK - Supple  CARD -s1s2, RRR, no M,G,R;   PULM - CTA b/l, symmetric breath sounds;   ABD -  +BS, ND, NT, soft, no guarding, no rebound, no masses;   BACK - no CVA tenderness, Normal  spine;   EXT - symmetric pulses, 2+ dp, capillary refill < 2 seconds, no cyanosis, no edema;   NEURO: alert, CN 2-12 intact, reflexes nl, sensation nl, coordination nl, romberg negative, motor 5/5 RUE/LUE/RLE/LLE/EHL/Plantar flexion, no pronator drift, gait nl     EKG NSR 92 BPM NSR with TWI in II, III, avf and V 5 and V6, similar to prev in 11/10/22

## 2024-05-15 NOTE — ED PROVIDER NOTE - PATIENT PORTAL LINK FT
You can access the FollowMyHealth Patient Portal offered by [facility] by registering at the following website: [email or web address]. By joining [employer]’s FollowMyHealth portal, you will also be able to view your health information using other applications (apps) compatible with our system.

## 2024-05-15 NOTE — ED ADULT NURSE NOTE - OBJECTIVE STATEMENT
Patient reveived to int. Patient c/o headache chest pain sob x3 days. Patient has IV awaiting fo results.

## 2024-05-15 NOTE — ED PROVIDER NOTE - OBJECTIVE STATEMENT
32 yo M with HTN (Non-compliant with valsartan 320mg/HCTZ 25mg) a/w headache and shortness of breath.  Pt reports having chronic headaches noted to have 3-4 episodes per week.  Reports headaches are mainly in the AM, with associated light sensitivity, described as pounding/throbbing in nature.  Reports no nausea or vomiting, no vision changes, no numbness, no tingling, no weakness.  Reports having shortness of breath intermittently, not currently, after smoking Hookah.  Reports no chest pain, no nausea, no vomiting.

## 2024-05-15 NOTE — ED PROVIDER NOTE - NSFOLLOWUPINSTRUCTIONS_ED_ALL_ED_FT
Follow up with your Primary Medical Doctor within 2-3days. If results or reports were given to you, show copies of your reports given to you. Take all of your medications as previously prescribed.    PLEASE  YOUR High blood pressure medication from your Pharmacy.     Headache    WHAT YOU NEED TO KNOW:    An acute headache is pain or discomfort that starts suddenly and gets worse quickly. You may have an acute headache only when you feel stress or eat certain foods. Other acute headache pain can happen every day, and sometimes several times a day.    DISCHARGE INSTRUCTIONS:    Return to the emergency department if:    You have severe pain.    You have numbness or weakness on one side of your face or body.    You have a headache that occurs after a blow to the head, a fall, or other trauma.    You have a headache, are forgetful or confused, or have trouble speaking.    You have a headache, stiff neck, and a fever.  Contact your healthcare provider if:    You have a constant headache and are vomiting.    You have a headache each day that does not get better, even after treatment.    You have changes in your headaches, or new symptoms that occur when you have a headache.    You have questions or concerns about your condition or care.  Medicines: You may need any of the following:    Prescription pain medicine may be given. The medicine your healthcare provider recommends will depend on the kind of headaches you have. You will need to take prescription headache medicines as directed to prevent a problem called rebound headache. These headaches happen with regular use of pain relievers for headache disorders.    NSAIDs, such as ibuprofen, help decrease swelling, pain, and fever. This medicine is available with or without a doctor's order. NSAIDs can cause stomach bleeding or kidney problems in certain people. If you take blood thinner medicine, always ask your healthcare provider if NSAIDs are safe for you. Always read the medicine label and follow directions.    Acetaminophen decreases pain and fever. It is available without a doctor's order. Ask how much to take and how often to take it. Follow directions. Read the labels of all other medicines you are using to see if they also contain acetaminophen, or ask your doctor or pharmacist. Acetaminophen can cause liver damage if not taken correctly. Do not use more than 3 grams (3,000 milligrams) total of acetaminophen in one day.    Antidepressants may be given for some kinds of headaches.    Take your medicine as directed. Contact your healthcare provider if you think your medicine is not helping or if you have side effects. Tell him or her if you are allergic to any medicine. Keep a list of the medicines, vitamins, and herbs you take. Include the amounts, and when and why you take them. Bring the list or the pill bottles to follow-up visits. Carry your medicine list with you in case of an emergency.  Manage your symptoms:    Apply heat or ice on the headache area. Use a heat or ice pack. For an ice pack, you can also put crushed ice in a plastic bag. Cover the pack or bag with a towel before you apply it to your skin. Ice and heat both help decrease pain, and heat also helps decrease muscle spasms. Apply heat for 20 to 30 minutes every 2 hours. Apply ice for 15 to 20 minutes every hour. Apply heat or ice for as long and for as many days as directed. You may alternate heat and ice.    Relax your muscles. Lie down in a comfortable position and close your eyes. Relax your muscles slowly. Start at your toes and work your way up your body.    Keep a record of your headaches. Write down when your headaches start and stop. Include your symptoms and what you were doing when the headache began. Record what you ate or drank for 24 hours before the headache started. Describe the pain and where it hurts. Keep track of what you did to treat your headache and if it worked.  Prevent an acute headache:    Avoid anything that triggers an acute headache. Examples include exposure to chemicals, going to high altitude, or not getting enough sleep. Create a regular sleep routine. Go to sleep at the same time and wake up at the same time each day. Do not use electronic devices before bedtime. These may trigger a headache or prevent you from sleeping well.    Do not smoke. Nicotine and other chemicals in cigarettes and cigars can trigger an acute headache or make it worse. Ask your healthcare provider for information if you currently smoke and need help to quit. E-cigarettes or smokeless tobacco still contain nicotine. Talk to your healthcare provider before you use these products.    Limit alcohol as directed. Alcohol can trigger an acute headache or make it worse. If you have cluster headaches, do not drink alcohol during an episode. For other types of headaches, ask your healthcare provider if it is safe for you to drink alcohol. Ask how much is safe for you to drink, and how often.    Exercise as directed. Exercise can reduce tension and help with headache pain. Aim for 30 minutes of physical activity on most days of the week. Your healthcare provider can help you create an exercise plan.    Eat a variety of healthy foods. Healthy foods include fruits, vegetables, low-fat dairy products, lean meats, fish, whole grains, and cooked beans. Your healthcare provider or dietitian can help you create meals plans if you need to avoid foods that trigger headaches.  Follow up with your healthcare provider as directed: Bring your headache record with you when you see your healthcare provider. Write down your questions so you remember to ask them during your visits.

## 2024-05-15 NOTE — ED PROVIDER NOTE - PROGRESS NOTE DETAILS
HERBERT Green: patient signed out to me pending trop and rpt BMP, trop 43 >44, creatinine 1.28, K 4.2, patient feeling improved. DC home with PMD f/u. discussed discharge plan with Dr. Huerta.

## 2024-05-27 NOTE — PATIENT PROFILE ADULT - NSPROHMSYMPCOND_GEN_A_NUR
- Hgb stable at 10  - no active bleeding clinically  - s/p IR embolization of R thigh hematoma which is the source of bleeding  - no need for blood transfusion  - resumed coumadin 5/24 and therapeutic lovenox pending therapeutic INR   none

## 2025-02-04 NOTE — H&P ADULT - PROBLEM SELECTOR PLAN 8
57 - DVT ppx with Lovenox 40mg daily given covid infection per protocol   - Case and plan to be discussed with attending - DVT ppx with Lovenox 40mg daily given covid infection per protocol   - Case and plan discussed with Dr. Valdez